# Patient Record
Sex: FEMALE | Race: WHITE | NOT HISPANIC OR LATINO | Employment: UNEMPLOYED | URBAN - METROPOLITAN AREA
[De-identification: names, ages, dates, MRNs, and addresses within clinical notes are randomized per-mention and may not be internally consistent; named-entity substitution may affect disease eponyms.]

---

## 2017-01-01 ENCOUNTER — PATIENT MESSAGE (OUTPATIENT)
Dept: ADMINISTRATIVE | Facility: HOSPITAL | Age: 67
End: 2017-01-01

## 2017-01-01 ENCOUNTER — TELEPHONE (OUTPATIENT)
Dept: UROLOGY | Facility: CLINIC | Age: 67
End: 2017-01-01

## 2017-01-01 ENCOUNTER — PATIENT MESSAGE (OUTPATIENT)
Dept: UROLOGY | Facility: CLINIC | Age: 67
End: 2017-01-01

## 2017-01-01 ENCOUNTER — PROCEDURE VISIT (OUTPATIENT)
Dept: UROLOGY | Facility: CLINIC | Age: 67
End: 2017-01-01
Payer: COMMERCIAL

## 2017-01-01 ENCOUNTER — HOSPITAL ENCOUNTER (OUTPATIENT)
Dept: RADIOLOGY | Facility: HOSPITAL | Age: 67
Discharge: HOME | End: 2017-02-21
Attending: UROLOGY
Payer: COMMERCIAL

## 2017-01-01 ENCOUNTER — TELEPHONE (OUTPATIENT)
Dept: UROLOGY | Facility: HOSPITAL | Age: 67
End: 2017-01-01

## 2017-01-01 ENCOUNTER — TELEPHONE (OUTPATIENT)
Dept: ADMINISTRATIVE | Facility: HOSPITAL | Age: 67
End: 2017-01-01

## 2017-01-01 ENCOUNTER — HOSPITAL ENCOUNTER (OUTPATIENT)
Dept: RADIOLOGY | Facility: HOSPITAL | Age: 67
Discharge: HOME | End: 2017-02-27
Attending: UROLOGY
Payer: COMMERCIAL

## 2017-01-01 ENCOUNTER — OFFICE VISIT (OUTPATIENT)
Dept: UROLOGY | Facility: CLINIC | Age: 67
End: 2017-01-01
Payer: COMMERCIAL

## 2017-01-01 VITALS
WEIGHT: 293 LBS | HEIGHT: 69 IN | DIASTOLIC BLOOD PRESSURE: 78 MMHG | SYSTOLIC BLOOD PRESSURE: 132 MMHG | BODY MASS INDEX: 43.4 KG/M2

## 2017-01-01 VITALS — HEIGHT: 69 IN | BODY MASS INDEX: 43.4 KG/M2 | WEIGHT: 293 LBS

## 2017-01-01 DIAGNOSIS — N39.0 RECURRENT UTI: ICD-10-CM

## 2017-01-01 DIAGNOSIS — N32.81 OVERACTIVE BLADDER: ICD-10-CM

## 2017-01-01 DIAGNOSIS — N28.89 RENAL MASS: ICD-10-CM

## 2017-01-01 DIAGNOSIS — N28.1 RENAL CYST: Primary | ICD-10-CM

## 2017-01-01 DIAGNOSIS — Z12.11 COLON CANCER SCREENING: ICD-10-CM

## 2017-01-01 DIAGNOSIS — N32.81 OVERACTIVE BLADDER: Primary | ICD-10-CM

## 2017-01-01 DIAGNOSIS — N28.89 RENAL MASS: Primary | ICD-10-CM

## 2017-01-01 LAB
BACTERIA UR CULT: NORMAL
BACTERIA UR CULT: NORMAL

## 2017-01-01 PROCEDURE — 76770 US EXAM ABDO BACK WALL COMP: CPT | Mod: 26,,, | Performed by: RADIOLOGY

## 2017-01-01 PROCEDURE — 99999 PR PBB SHADOW E&M-EST. PATIENT-LVL III: CPT | Mod: PBBFAC,,, | Performed by: UROLOGY

## 2017-01-01 PROCEDURE — 87186 SC STD MICRODIL/AGAR DIL: CPT

## 2017-01-01 PROCEDURE — 52000 CYSTOURETHROSCOPY: CPT | Mod: S$GLB,,, | Performed by: UROLOGY

## 2017-01-01 PROCEDURE — 3075F SYST BP GE 130 - 139MM HG: CPT | Mod: S$GLB,,, | Performed by: UROLOGY

## 2017-01-01 PROCEDURE — 87086 URINE CULTURE/COLONY COUNT: CPT

## 2017-01-01 PROCEDURE — 76770 US EXAM ABDO BACK WALL COMP: CPT | Mod: TC

## 2017-01-01 PROCEDURE — 87077 CULTURE AEROBIC IDENTIFY: CPT

## 2017-01-01 PROCEDURE — 99204 OFFICE O/P NEW MOD 45 MIN: CPT | Mod: S$GLB,,, | Performed by: UROLOGY

## 2017-01-01 PROCEDURE — 74170 CT ABD WO CNTRST FLWD CNTRST: CPT | Mod: 26,,, | Performed by: RADIOLOGY

## 2017-01-01 PROCEDURE — 74170 CT ABD WO CNTRST FLWD CNTRST: CPT | Mod: TC

## 2017-01-01 PROCEDURE — 25500020 PHARM REV CODE 255: Performed by: UROLOGY

## 2017-01-01 PROCEDURE — 87088 URINE BACTERIA CULTURE: CPT

## 2017-01-01 PROCEDURE — 3078F DIAST BP <80 MM HG: CPT | Mod: S$GLB,,, | Performed by: UROLOGY

## 2017-01-01 RX ORDER — GLIMEPIRIDE 4 MG/1
4 TABLET ORAL
Status: ON HOLD | COMMUNITY
Start: 2016-11-02 | End: 2018-01-01 | Stop reason: HOSPADM

## 2017-01-01 RX ORDER — APIXABAN 5 MG/1
5 TABLET, FILM COATED ORAL 2 TIMES DAILY
Status: ON HOLD | COMMUNITY
Start: 2017-01-17 | End: 2018-01-01 | Stop reason: HOSPADM

## 2017-01-01 RX ORDER — SITAGLIPTIN 100 MG/1
100 TABLET, FILM COATED ORAL DAILY
Status: ON HOLD | COMMUNITY
Start: 2017-01-28 | End: 2018-01-01 | Stop reason: HOSPADM

## 2017-01-01 RX ORDER — FLUTICASONE PROPIONATE 50 MCG
1 SPRAY, SUSPENSION (ML) NASAL
Status: ON HOLD | COMMUNITY
Start: 2017-01-01 | End: 2018-01-01 | Stop reason: HOSPADM

## 2017-01-01 RX ORDER — NITROFURANTOIN 25; 75 MG/1; MG/1
100 CAPSULE ORAL 2 TIMES DAILY
Qty: 30 CAPSULE | Refills: 11 | Status: ON HOLD | OUTPATIENT
Start: 2017-01-01 | End: 2018-01-01 | Stop reason: HOSPADM

## 2017-01-01 RX ORDER — CIPROFLOXACIN 500 MG/1
500 TABLET ORAL 2 TIMES DAILY
Qty: 14 TABLET | Refills: 0 | Status: ON HOLD | OUTPATIENT
Start: 2017-01-01 | End: 2018-01-01 | Stop reason: HOSPADM

## 2017-01-01 RX ORDER — HYDRALAZINE HYDROCHLORIDE 100 MG/1
100 TABLET, FILM COATED ORAL 3 TIMES DAILY
Refills: 3 | Status: ON HOLD | COMMUNITY
Start: 2017-01-09 | End: 2018-01-01 | Stop reason: HOSPADM

## 2017-01-01 RX ORDER — SULFAMETHOXAZOLE AND TRIMETHOPRIM 800; 160 MG/1; MG/1
1 TABLET ORAL 2 TIMES DAILY
Qty: 14 TABLET | Refills: 0 | Status: SHIPPED | OUTPATIENT
Start: 2017-01-01 | End: 2017-01-01

## 2017-01-01 RX ORDER — CEPHALEXIN 500 MG/1
500 CAPSULE ORAL EVERY 8 HOURS
Qty: 30 CAPSULE | Refills: 0 | Status: SHIPPED | OUTPATIENT
Start: 2017-01-01 | End: 2017-01-01

## 2017-01-01 RX ORDER — SPIRONOLACTONE 100 MG/1
100 TABLET, FILM COATED ORAL DAILY
Refills: 3 | Status: ON HOLD | COMMUNITY
Start: 2017-01-09 | End: 2018-01-01 | Stop reason: HOSPADM

## 2017-01-01 RX ORDER — METOPROLOL SUCCINATE 100 MG/1
100 TABLET, EXTENDED RELEASE ORAL DAILY
Status: ON HOLD | COMMUNITY
Start: 2016-12-07 | End: 2018-01-01 | Stop reason: HOSPADM

## 2017-01-01 RX ADMIN — IOHEXOL 100 ML: 350 INJECTION, SOLUTION INTRAVENOUS at 03:02

## 2017-01-01 RX ADMIN — IOHEXOL 15 ML: 300 INJECTION, SOLUTION INTRAVENOUS at 03:02

## 2017-02-06 PROBLEM — N39.0 RECURRENT UTI: Status: ACTIVE | Noted: 2017-01-01

## 2017-02-06 NOTE — PROGRESS NOTES
"  Subjective:       Rena Quintana is a 66 y.o. female who is a new patient who was self-referred for evaluation of UI     Urinary Incontinence  Patient complains of urinary incontinence. This has been present for several years. She leaks urine with with urge. Patient describes the symptoms as frequent urination (many x per day), nocturia 3 times per night, sensation of incomplete emptying of bladder and urge to urinate with little or no warning. Factors associated with symptoms include none known. Evaluation to date includes none. Treatment to date includes oxybutinin, which was somewhat effective. She is s/p hysterectomy (age 40). Also reports "bladder tack" done for AMI (?sling) when she was young but then had another child after that.     She has seen a urologist since teenager (Dr Tovar) for recurrent UTIs. UA is suspicious for UTI today. She has been on Macrobid 100mg daily - but off this for a while. Mild dysuria, worsened with intercourse.     She takes Lasix, spironolactone, Eliquis, DM2, and OAC among her multiple medications. BMI 44. She has pulm HTN and CHF.       The following portions of the patient's history were reviewed and updated as appropriate: allergies, current medications, past family history, past medical history, past social history, past surgical history and problem list.    Review of Systems  Constitutional: no fever or chills  ENT: no nasal congestion or sore throat  Respiratory: no cough or shortness of breath  Cardiovascular: no chest pain or palpitations  Gastrointestinal: no nausea or vomiting, tolerating diet  Genitourinary: as per HPI  Hematologic/Lymphatic: no easy bruising or lymphadenopathy  Musculoskeletal: no arthralgias or myalgias  Skin: no rashes or lesions  Neurological: no seizures or tremors  Behavioral/Psych: no auditory or visual hallucinations       Objective:    Vitals:   Visit Vitals    /78    Ht 5' 9" (1.753 m)    Wt 136 kg (299 lb 13.2 oz)    BMI 44.28 " kg/m2       Physical Exam   General: well developed, well nourished in no acute distress  Head: normocephalic, atraumatic  Neck: supple, trachea midline, no obvious enlargement of thyroid  HEENT: EOMI, mucus membranes moist, sclera anicteric, no hearing impairment  Lungs: symmetric expansion, non-labored breathing  Cardiovascular: regular rate and rhythm, normal pulses  Abdomen: soft, non tender, non distended, no palpable masses, no hepatosplenomegaly, no hernias, no CVA tenderness  Musculoskeletal: no peripheral edema, normal ROM in bilateral upper and lower extremities  Lymphatics: no cervical or inguinal lymphadenopathy  Skin: no rashes or lesions  Neuro: alert and oriented x 3, no gross deficits  Psych: normal judgment and insight, normal mood/affect and non-anxious  Genitourinary:   patient declined exam      Lab Review   Urine analysis today in clinic shows positive for nitrites, leukocytes, red blood cells, glucose, protein     Lab Results   Component Value Date    WBC 7.61 09/13/2013    HGB 10.6 (L) 09/13/2013    HCT 33.0 (L) 09/13/2013    MCV 80 (L) 09/13/2013     09/13/2013     Lab Results   Component Value Date    CREATININE 0.8 09/13/2013    BUN 14 09/13/2013         Imaging  NA         Assessment/Plan:      1. Overactive bladder / UUI   - Discussed difference of UUI and AMI components. Reviewed etiology and workup of each.   - UUI: Behavioral changes, PFPT, anticholinergics. Botox/InterStim for refractory UUI. Will try to avoid Botox for now due to comorbidities but she may be good candidate for this.    - Continue Ditropan XL 15   - Add Myrbetriq 50mg     2. Recurrent UTI    - Discussed UTI prevention strategies.   - Adequate hydration.   - Double voiding. Consider timed voiding.    - Avoid constipation.   - RANDEE with PVR to monitor upper tracts/AARON.   - Cystoscopy   - Cranberry/probiotics.   - Consider Estrace cream 2x weekly - will consider at next appt   - Macrobid 100mg daily (prior  regimen)   - Call with UTI symptoms so UA/UCx can be sent.    - UCx today (UA suspicous)         Follow up in 6 weeks for cystoscopy

## 2017-02-06 NOTE — MR AVS SNAPSHOT
[facility]  [address]  [address]  [place] [address]  Phone: [phone]                  Rena Quintana   2017 1:00 PM   Office Visit    Description:  Female : 1950   Provider:  Lindsey Yost MD   Department:  [facility]           Reason for Visit     Establish Care           Diagnoses this Visit        Comments    Overactive bladder    -  Primary     Recurrent UTI                To Do List           Goals (5 Years of Data)     None      Follow-Up and Disposition     Return in about 6 weeks (around 3/20/2017) for Renal US follow up, Cystoscopy.       These Medications        Disp Refills Start End    mirabegron 50 mg Tb24 30 tablet 11 2017    Take 1 tablet (50 mg total) by mouth once daily. - Oral    Pharmacy: [facility] [address] Ph #: [phone]       nitrofurantoin, macrocrystal-monohydrate, (MACROBID) 100 MG capsule 30 capsule 11 2017    Take 1 capsule (100 mg total) by mouth 2 (two) times daily. - Oral    Pharmacy: [address] Ph #: [phone]         [facility] On Call     [facility] On Call Nurse Care Line -  Assistance  Registered nurses in the [facility] provide clinical advisement, health education, appointment booking, and other advisory services.  Call for this free service at [phone].             Medications           Message regarding Medications     Verify the changes and/or additions to your medication regime listed below are the same as discussed with your clinician today.  If any of these changes or additions are incorrect, please notify your healthcare provider.        START taking these NEW medications        Refills    mirabegron 50 mg Tb24 11    Sig: Take 1 tablet (50 mg total) by mouth once daily.    Class: Normal    Route: Oral    nitrofurantoin, macrocrystal-monohydrate, (MACROBID) 100 MG  capsule 11    Sig: Take 1 capsule (100 mg total) by mouth 2 (two) times daily.    Class: Normal    Route: Oral      STOP taking these medications     dextrose 5 % SolP 100 mL with milrinone 1 mg/mL Soln 40 mg Inject into the vein continuous.    doxycycline (MONODOX) 100 MG capsule Take 1 capsule (100 mg total) by mouth 2 (two) times daily.    KLOR-CON M20 20 mEq tablet     labetalol (NORMODYNE) 200 MG tablet Take 200 mg by mouth 2 (two) times daily. Take 2 tabs by mouth 2 times a day    metolazone (ZAROXOLYN) 5 MG tablet     tadalafil (ADCIRCA) 20 MG Tab Take one tablet daily for seven days. If tolerated, increase to two tablets daily thereafter.           Verify that the below list of medications is an accurate representation of the medications you are currently taking.  If none reported, the list may be blank. If incorrect, please contact your healthcare provider. Carry this list with you in case of emergency.           Current Medications     amiodarone (PACERONE) 200 MG Tab     amlodipine (NORVASC) 10 MG tablet Take 10 mg by mouth once daily.    atorvastatin (LIPITOR) 40 MG tablet Take 40 mg by mouth once daily.    ELIQUIS 5 mg Tab     glimepiride (AMARYL) 4 MG tablet     hydrALAZINE (APRESOLINE) 100 MG tablet Take 100 mg by mouth 3 (three) times daily.    JANUVIA 100 mg Tab     levothyroxine (SYNTHROID) 50 MCG tablet Take 50 mcg by mouth once daily.    metformin (GLUCOPHAGE) 1000 MG tablet Take 1,000 mg by mouth 2 (two) times daily with meals.    metoprolol succinate (TOPROL-XL) 100 MG 24 hr tablet     multivitamin (ONE DAILY MULTIVITAMIN) per tablet Take 1 tablet by mouth once daily.    oxybutynin (DITROPAN XL) 15 MG TR24 Take 5 mg by mouth once daily.    sertraline (ZOLOFT) 50 MG tablet     sildenafil (REVATIO) 20 mg Tab Take 20 mg by mouth 3 (three) times daily. Discontinued 9/13/13--to start Adcirca    spironolactone (ALDACTONE) 100 MG tablet Take 100 mg by mouth once daily.    valsartan (DIOVAN) 320 MG  "tablet Take 320 mg by mouth once daily.    vitamin D 1000 units Tab Take 185 mg by mouth once daily.    furosemide (LASIX) 40 MG tablet Take 1 tablet (40 mg total) by mouth once daily.    mirabegron 50 mg Tb24 Take 1 tablet (50 mg total) by mouth once daily.    nitrofurantoin, macrocrystal-monohydrate, (MACROBID) 100 MG capsule Take 1 capsule (100 mg total) by mouth 2 (two) times daily.           Clinical Reference Information           Your Vitals Were     BP Height Weight BMI       132/78 5' 9" (1.753 m) 136 kg (299 lb 13.2 oz) 44.28 kg/m2       Blood Pressure          Most Recent Value    BP  132/78      Allergies as of 2/6/2017     Cardizem [Diltiazem Hcl]    Procardia [Nifedipine]      Immunizations Administered on Date of Encounter - 2/6/2017     None      Orders Placed During Today's Visit      Normal Orders This Visit    Urine culture     Future Labs/Procedures Expected by Expires    US Retroperitoneal Complete (Kidney and  2/6/2017 2/7/2018    Cystoscopy  As directed 2/6/2018      [facility] Sign-Up     Activating your [facility] account is as easy as 1-2-3!     1) Visit [email or web address], select Sign Up Now, enter this activation code and your date of birth, then select Next.  [email or web address]  Expires: 3/23/2017  1:36 PM      2) Create a username and password to use when you visit [facility] in the future and select a security question in case you lose your password and select Next.    3) Enter your e-mail address and click Sign Up!    Additional Information  If you have questions, please e-mail [email or web address] or call [phone] to talk to our [facility] staff. Remember, [facility] is NOT to be used for urgent needs. For medical emergencies, dial 911.         Language Assistance Services     ATTENTION: Language assistance services are available, free of charge. Please call [phone].      ATENCIÓN: Si habla español, tiene a ocampo disposición servicios gratuitos de asistencia lingüística. Llame al " [phone].     JERRY Ý: N?u b?n nói Ti?ng Vi?t, có các d?ch v? h? tr? ngôn ng? mi?n phí dành cho b?n. G?i s? [phone].         [facility] complies with applicable Federal civil rights laws and does not discriminate on the basis of race, color, national origin, age, disability, or sex.

## 2017-02-08 NOTE — TELEPHONE ENCOUNTER
Please inform patient of urinary tract infection on recent urine culture. Appropriate antibiotics (Bactrim) were sent in to the pharmacy.

## 2017-02-22 NOTE — TELEPHONE ENCOUNTER
Discussed abnormal RANDEE result with patient.     CT renal protocol and Cr ordered.    Please help arrange to be done in next 1-2 weeks. Thanks.

## 2017-02-22 NOTE — TELEPHONE ENCOUNTER
CT set for Monday Feb 27.2017. Patient given instructions and report 1 hour before apt time to be prepped.

## 2017-02-27 NOTE — TELEPHONE ENCOUNTER
Please inform pt:    CT scan showed that the cyst in the kidney does not need intervention or surgery at this time.     Recommend RANDEE in 6 months to make sure it stays stable.     Keep follow up appt with me to discuss.

## 2017-03-21 NOTE — MR AVS SNAPSHOT
[facility]  [address]  [address]  [place] [address]  Phone: [phone]                  Rena Quintana   3/21/2017 9:40 AM   Procedure visit    Description:  Female : 1950   Provider:  Lindsey Yost MD   Department:  [facility]           Reason for Visit     Over active bladder           Diagnoses this Visit        Comments    Overactive bladder         Recurrent UTI                To Do List           Future Appointments        Provider Department Dept Phone    2017 10:00 AM Lindsey Yost MD [facility] [phone]      Goals (5 Years of Data)     None      Follow-Up and Disposition     Return in about 3 months (around 2017) for Follow up on symptoms.       These Medications        Disp Refills Start End    mirabegron 50 mg Tb24 90 tablet 3 3/21/2017 3/21/2018    Take 1 tablet (50 mg total) by mouth once daily. - Oral    Pharmacy: [address] Ph #: [phone]       cephALEXin (KEFLEX) 500 MG capsule 30 capsule 0 3/21/2017 3/31/2017    Take 1 capsule (500 mg total) by mouth every 8 (eight) hours. - Oral    Pharmacy: [address] Ph #: [phone]         [facility] On Call     [facility] On Call Nurse Care Line -  Assistance  Registered nurses in the [facility] provide clinical advisement, health education, appointment booking, and other advisory services.  Call for this free service at [phone].             Medications           Message regarding Medications     Verify the changes and/or additions to your medication regime listed below are the same as discussed with your clinician today.  If any of these changes or additions are incorrect, please notify your healthcare provider.        START taking these NEW medications        Refills    cephALEXin (KEFLEX) 500 MG capsule 0    Sig: Take 1 capsule (500 mg total)  by mouth every 8 (eight) hours.    Class: Normal    Route: Oral           Verify that the below list of medications is an accurate representation of the medications you are currently taking.  If none reported, the list may be blank. If incorrect, please contact your healthcare provider. Carry this list with you in case of emergency.           Current Medications     amiodarone (PACERONE) 200 MG Tab     amlodipine (NORVASC) 10 MG tablet Take 10 mg by mouth once daily.    atorvastatin (LIPITOR) 40 MG tablet Take 40 mg by mouth once daily.    ELIQUIS 5 mg Tab 5 mg 2 (two) times daily.     fluticasone (FLONASE) 50 mcg/actuation nasal spray 1 spray by Nasal route as needed.     furosemide (LASIX) 40 MG tablet Take 1 tablet (40 mg total) by mouth once daily.    glimepiride (AMARYL) 4 MG tablet Take 4 mg by mouth daily with breakfast.     hydrALAZINE (APRESOLINE) 100 MG tablet Take 100 mg by mouth 3 (three) times daily.    JANUVIA 100 mg Tab Take 100 mg by mouth once daily.     levothyroxine (SYNTHROID) 50 MCG tablet Take 50 mcg by mouth once daily.    metformin (GLUCOPHAGE) 1000 MG tablet Take 1,000 mg by mouth 2 (two) times daily with meals.    metoprolol succinate (TOPROL-XL) 100 MG 24 hr tablet Take 100 mg by mouth once daily.     mirabegron 50 mg Tb24 Take 1 tablet (50 mg total) by mouth once daily.    nitrofurantoin, macrocrystal-monohydrate, (MACROBID) 100 MG capsule Take 1 capsule (100 mg total) by mouth 2 (two) times daily.    oxybutynin (DITROPAN XL) 15 MG TR24 Take 5 mg by mouth once daily.    sertraline (ZOLOFT) 50 MG tablet Take 50 mg by mouth once daily.     sildenafil (REVATIO) 20 mg Tab Take 20 mg by mouth 3 (three) times daily. Discontinued 9/13/13--to start Adcirca    spironolactone (ALDACTONE) 100 MG tablet Take 100 mg by mouth once daily.    valsartan (DIOVAN) 320 MG tablet Take 320 mg by mouth once daily.    vitamin D 1000 units Tab Take 185 mg by mouth once daily.    cephALEXin (KEFLEX) 500 MG capsule  "Take 1 capsule (500 mg total) by mouth every 8 (eight) hours.    multivitamin (ONE DAILY MULTIVITAMIN) per tablet Take 1 tablet by mouth once daily.           Clinical Reference Information           Your Vitals Were     Height Weight BMI          5' 9" (1.753 m) 135.2 kg (298 lb 1 oz) 44.02 kg/m2        Allergies as of 3/21/2017     Cardizem [Diltiazem Hcl]    Procardia [Nifedipine]      Immunizations Administered on Date of Encounter - 3/21/2017     None      Orders Placed During Today's Visit      Normal Orders This Visit    Cystoscopy     Urine culture       Language Assistance Services     ATTENTION: Language assistance services are available, free of charge. Please call [phone].      ATENCIÓN: Si [place], tiene a ocampo disposición servicios gratuitos de asistencia lingüística. Llame al [phone].     CHÚ Ý: N?u b?n nói Ti?ng Vi?t, có các d?ch v? h? tr? ngôn ng? mi?n phí dành cho b?n. G?i s? [phone].         [facility] complies with applicable Federal civil rights laws and does not discriminate on the basis of race, color, national origin, age, disability, or sex.        "

## 2017-03-21 NOTE — PROCEDURES
"Cystoscopy  Date/Time: 3/21/2017 10:09 AM  Performed by: JAIMEE JOHNSTON  Authorized by: JAIMEE JOHNSTON     Consent Done?:  Yes (Written)  Time out: Immediately prior to procedure a "time out" was called to verify the correct patient, procedure, equipment, support staff and site/side marked as required.    Indications: recurrent UTIs    Position:  Dorsal lithotomy  Anesthesia:  Lidocaine jelly  Patient sedated?: No    Preparation: Patient was prepped and draped in usual sterile fashion      Scope type:  Flexible cystoscope  Stent inserted: No    Stent removed: No    External exam normal: Yes    Digital exam performed: No    Urethra normal: Yes  Bladder neck normal: Bladder neck normal   Bladder normal: No (Widespread cystitis cystica c/w UTI. No tumors, stones.)      Patient tolerance:  Patient tolerated the procedure well with no immediate complications     +cystitis cystica on cysto. Suspect active UTI.  UCx today. Treat empirically with Keflex.  Bosniak 2F cyst on CT - recheck RANDEE in 6mths (8/17).  Consider change of DM2 medications to be off Januvia. Will tall to PCP.      "

## 2017-03-27 NOTE — TELEPHONE ENCOUNTER
Please inform patient of urinary tract infection on recent urine culture. Appropriate antibiotics (Cipro) were sent in to the pharmacy.      Stop Keflex given and start Cipro.

## 2017-03-28 NOTE — TELEPHONE ENCOUNTER
Called and spoke to patient's . Patient is currently inpatient at [facility]. I advised him that patient UCx came back showing she had a UTI and what abt we gave. I advised him to let the provider caring for her know that they may treat accordingly while she is inpatient.

## 2017-04-07 NOTE — TELEPHONE ENCOUNTER
Spoke with Ms. Mariano she stated that she was just released from [facility] for the flu. SHe will follow up with DR. Yanes in about 2 weeks and will sign a release of information on the visit.

## 2018-01-01 ENCOUNTER — TELEPHONE (OUTPATIENT)
Dept: ADMINISTRATIVE | Facility: HOSPITAL | Age: 68
End: 2018-01-01

## 2018-01-01 ENCOUNTER — HOSPITAL ENCOUNTER (INPATIENT)
Facility: HOSPITAL | Age: 68
LOS: 1 days | DRG: 393 | End: 2018-02-04
Attending: INTERNAL MEDICINE | Admitting: INTERNAL MEDICINE
Payer: MEDICARE

## 2018-01-01 VITALS
SYSTOLIC BLOOD PRESSURE: 98 MMHG | HEART RATE: 94 BPM | RESPIRATION RATE: 6 BRPM | TEMPERATURE: 99 F | HEIGHT: 62 IN | OXYGEN SATURATION: 93 % | WEIGHT: 293 LBS | BODY MASS INDEX: 53.92 KG/M2 | DIASTOLIC BLOOD PRESSURE: 57 MMHG

## 2018-01-01 DIAGNOSIS — R00.0 TACHYCARDIA: ICD-10-CM

## 2018-01-01 DIAGNOSIS — I48.91 ATRIAL FIBRILLATION: ICD-10-CM

## 2018-01-01 DIAGNOSIS — K22.89: ICD-10-CM

## 2018-01-01 LAB
ALBUMIN SERPL BCP-MCNC: 2.8 G/DL
ALBUMIN SERPL BCP-MCNC: 3.1 G/DL
ALLENS TEST: ABNORMAL
ALP SERPL-CCNC: 87 U/L
ALP SERPL-CCNC: 99 U/L
ALT SERPL W/O P-5'-P-CCNC: 560 U/L
ALT SERPL W/O P-5'-P-CCNC: 958 U/L
ANION GAP SERPL CALC-SCNC: 12 MMOL/L
ANION GAP SERPL CALC-SCNC: 15 MMOL/L
AST SERPL-CCNC: 815 U/L
AST SERPL-CCNC: 964 U/L
BACTERIA #/AREA URNS AUTO: ABNORMAL /HPF
BASOPHILS # BLD AUTO: 0.06 K/UL
BASOPHILS # BLD AUTO: 0.1 K/UL
BASOPHILS NFR BLD: 0.3 %
BASOPHILS NFR BLD: 0.5 %
BILIRUB SERPL-MCNC: 1.4 MG/DL
BILIRUB SERPL-MCNC: 1.9 MG/DL
BILIRUB UR QL STRIP: NEGATIVE
BUN SERPL-MCNC: 30 MG/DL
BUN SERPL-MCNC: 50 MG/DL
CALCIUM SERPL-MCNC: 8.4 MG/DL
CALCIUM SERPL-MCNC: 8.8 MG/DL
CHLORIDE SERPL-SCNC: 100 MMOL/L
CHLORIDE SERPL-SCNC: 97 MMOL/L
CLARITY UR REFRACT.AUTO: ABNORMAL
CO2 SERPL-SCNC: 20 MMOL/L
CO2 SERPL-SCNC: 23 MMOL/L
COLOR UR AUTO: ABNORMAL
CREAT SERPL-MCNC: 2.4 MG/DL
CREAT SERPL-MCNC: 3.8 MG/DL
DELSYS: ABNORMAL
DIFFERENTIAL METHOD: ABNORMAL
DIFFERENTIAL METHOD: ABNORMAL
EOSINOPHIL # BLD AUTO: 0 K/UL
EOSINOPHIL # BLD AUTO: 0.1 K/UL
EOSINOPHIL NFR BLD: 0.2 %
EOSINOPHIL NFR BLD: 0.3 %
ERYTHROCYTE [DISTWIDTH] IN BLOOD BY AUTOMATED COUNT: 15.2 %
ERYTHROCYTE [DISTWIDTH] IN BLOOD BY AUTOMATED COUNT: 16.1 %
ERYTHROCYTE [SEDIMENTATION RATE] IN BLOOD BY WESTERGREN METHOD: 12 MM/H
EST. GFR  (AFRICAN AMERICAN): 13.6 ML/MIN/1.73 M^2
EST. GFR  (AFRICAN AMERICAN): 23.7 ML/MIN/1.73 M^2
EST. GFR  (NON AFRICAN AMERICAN): 11.8 ML/MIN/1.73 M^2
EST. GFR  (NON AFRICAN AMERICAN): 20.6 ML/MIN/1.73 M^2
ESTIMATED AVG GLUCOSE: 174 MG/DL
ESTIMATED PA SYSTOLIC PRESSURE: 42.44
FIO2: 100
GLUCOSE SERPL-MCNC: 213 MG/DL
GLUCOSE SERPL-MCNC: 226 MG/DL
GLUCOSE UR QL STRIP: ABNORMAL
GRAM STN SPEC: NORMAL
GRAM STN SPEC: NORMAL
HBA1C MFR BLD HPLC: 7.7 %
HCO3 UR-SCNC: 26.7 MMOL/L (ref 24–28)
HCT VFR BLD AUTO: 40.9 %
HCT VFR BLD AUTO: 41.5 %
HGB BLD-MCNC: 13.2 G/DL
HGB BLD-MCNC: 13.5 G/DL
HGB UR QL STRIP: ABNORMAL
HYALINE CASTS UR QL AUTO: 0 /LPF
IMM GRANULOCYTES # BLD AUTO: 0.17 K/UL
IMM GRANULOCYTES # BLD AUTO: 0.27 K/UL
IMM GRANULOCYTES NFR BLD AUTO: 0.9 %
IMM GRANULOCYTES NFR BLD AUTO: 1.5 %
INR PPP: 1.4
INR PPP: 1.5
KETONES UR QL STRIP: NEGATIVE
LACTATE SERPL-SCNC: 1.9 MMOL/L
LEUKOCYTE ESTERASE UR QL STRIP: NEGATIVE
LYMPHOCYTES # BLD AUTO: 2.4 K/UL
LYMPHOCYTES # BLD AUTO: 3.3 K/UL
LYMPHOCYTES NFR BLD: 13.2 %
LYMPHOCYTES NFR BLD: 16.7 %
MAGNESIUM SERPL-MCNC: 1.5 MG/DL
MAGNESIUM SERPL-MCNC: 2.2 MG/DL
MCH RBC QN AUTO: 27.8 PG
MCH RBC QN AUTO: 28.1 PG
MCHC RBC AUTO-ENTMCNC: 32.3 G/DL
MCHC RBC AUTO-ENTMCNC: 32.5 G/DL
MCV RBC AUTO: 86 FL
MCV RBC AUTO: 86 FL
MICROSCOPIC COMMENT: ABNORMAL
MITRAL VALVE MOBILITY: NORMAL
MODE: ABNORMAL
MONOCYTES # BLD AUTO: 1.1 K/UL
MONOCYTES # BLD AUTO: 1.3 K/UL
MONOCYTES NFR BLD: 5.4 %
MONOCYTES NFR BLD: 7 %
NEUTROPHILS # BLD AUTO: 14.3 K/UL
NEUTROPHILS # BLD AUTO: 15.3 K/UL
NEUTROPHILS NFR BLD: 76.2 %
NEUTROPHILS NFR BLD: 77.8 %
NITRITE UR QL STRIP: NEGATIVE
NRBC BLD-RTO: 0 /100 WBC
NRBC BLD-RTO: 1 /100 WBC
PCO2 BLDA: 38.6 MMHG (ref 35–45)
PEEP: 5
PH SMN: 7.45 [PH] (ref 7.35–7.45)
PH UR STRIP: 5 [PH] (ref 5–8)
PHOSPHATE SERPL-MCNC: 2.3 MG/DL
PHOSPHATE SERPL-MCNC: 7 MG/DL
PIP: 26
PLATELET # BLD AUTO: 101 K/UL
PLATELET # BLD AUTO: 181 K/UL
PMV BLD AUTO: 10.8 FL
PMV BLD AUTO: 11.2 FL
PO2 BLDA: 87 MMHG (ref 80–100)
POC BE: 3 MMOL/L
POC SATURATED O2: 97 % (ref 95–100)
POC TCO2: 28 MMOL/L (ref 23–27)
POCT GLUCOSE: 120 MG/DL (ref 70–110)
POCT GLUCOSE: 141 MG/DL (ref 70–110)
POCT GLUCOSE: 216 MG/DL (ref 70–110)
POCT GLUCOSE: 234 MG/DL (ref 70–110)
POTASSIUM SERPL-SCNC: 4.4 MMOL/L
POTASSIUM SERPL-SCNC: 4.8 MMOL/L
PROT SERPL-MCNC: 6.4 G/DL
PROT SERPL-MCNC: 6.9 G/DL
PROT UR QL STRIP: ABNORMAL
PROTHROMBIN TIME: 13.7 SEC
PROTHROMBIN TIME: 14.6 SEC
RBC # BLD AUTO: 4.75 M/UL
RBC # BLD AUTO: 4.81 M/UL
RBC #/AREA URNS AUTO: 2 /HPF (ref 0–4)
RETIRED EF AND QEF - SEE NOTES: 40 (ref 55–65)
SAMPLE: ABNORMAL
SITE: ABNORMAL
SODIUM SERPL-SCNC: 132 MMOL/L
SODIUM SERPL-SCNC: 135 MMOL/L
SP GR UR STRIP: >=1.03 (ref 1–1.03)
SP02: 96
SQUAMOUS #/AREA URNS AUTO: 1 /HPF
TRICUSPID VALVE REGURGITATION: ABNORMAL
URN SPEC COLLECT METH UR: ABNORMAL
UROBILINOGEN UR STRIP-ACNC: NEGATIVE EU/DL
VANCOMYCIN SERPL-MCNC: 23.1 UG/ML
VANCOMYCIN SERPL-MCNC: 38.9 UG/ML
VT: 450
WBC # BLD AUTO: 18.41 K/UL
WBC # BLD AUTO: 20 K/UL
WBC #/AREA URNS AUTO: 4 /HPF (ref 0–5)

## 2018-01-01 PROCEDURE — 83735 ASSAY OF MAGNESIUM: CPT

## 2018-01-01 PROCEDURE — 97802 MEDICAL NUTRITION INDIV IN: CPT

## 2018-01-01 PROCEDURE — 94003 VENT MGMT INPAT SUBQ DAY: CPT

## 2018-01-01 PROCEDURE — 80053 COMPREHEN METABOLIC PANEL: CPT

## 2018-01-01 PROCEDURE — 25000003 PHARM REV CODE 250: Performed by: STUDENT IN AN ORGANIZED HEALTH CARE EDUCATION/TRAINING PROGRAM

## 2018-01-01 PROCEDURE — 94761 N-INVAS EAR/PLS OXIMETRY MLT: CPT

## 2018-01-01 PROCEDURE — 25000003 PHARM REV CODE 250: Performed by: INTERNAL MEDICINE

## 2018-01-01 PROCEDURE — 99223 1ST HOSP IP/OBS HIGH 75: CPT | Mod: ,,, | Performed by: PSYCHIATRY & NEUROLOGY

## 2018-01-01 PROCEDURE — 99291 CRITICAL CARE FIRST HOUR: CPT | Mod: ,,, | Performed by: INTERNAL MEDICINE

## 2018-01-01 PROCEDURE — 25000003 PHARM REV CODE 250

## 2018-01-01 PROCEDURE — 84100 ASSAY OF PHOSPHORUS: CPT

## 2018-01-01 PROCEDURE — 99223 1ST HOSP IP/OBS HIGH 75: CPT | Mod: ,,, | Performed by: THORACIC SURGERY (CARDIOTHORACIC VASCULAR SURGERY)

## 2018-01-01 PROCEDURE — 63600175 PHARM REV CODE 636 W HCPCS: Performed by: STUDENT IN AN ORGANIZED HEALTH CARE EDUCATION/TRAINING PROGRAM

## 2018-01-01 PROCEDURE — 85610 PROTHROMBIN TIME: CPT

## 2018-01-01 PROCEDURE — 25000003 PHARM REV CODE 250: Performed by: HOSPITALIST

## 2018-01-01 PROCEDURE — 80202 ASSAY OF VANCOMYCIN: CPT | Mod: 91

## 2018-01-01 PROCEDURE — S0028 INJECTION, FAMOTIDINE, 20 MG: HCPCS | Performed by: INTERNAL MEDICINE

## 2018-01-01 PROCEDURE — 37799 UNLISTED PX VASCULAR SURGERY: CPT

## 2018-01-01 PROCEDURE — 63600175 PHARM REV CODE 636 W HCPCS: Performed by: INTERNAL MEDICINE

## 2018-01-01 PROCEDURE — 83605 ASSAY OF LACTIC ACID: CPT

## 2018-01-01 PROCEDURE — 83036 HEMOGLOBIN GLYCOSYLATED A1C: CPT

## 2018-01-01 PROCEDURE — 99233 SBSQ HOSP IP/OBS HIGH 50: CPT | Mod: ,,, | Performed by: PSYCHIATRY & NEUROLOGY

## 2018-01-01 PROCEDURE — 99900026 HC AIRWAY MAINTENANCE (STAT)

## 2018-01-01 PROCEDURE — 93306 TTE W/DOPPLER COMPLETE: CPT | Mod: 26,,, | Performed by: INTERNAL MEDICINE

## 2018-01-01 PROCEDURE — 99292 CRITICAL CARE ADDL 30 MIN: CPT | Mod: ,,, | Performed by: INTERNAL MEDICINE

## 2018-01-01 PROCEDURE — 82803 BLOOD GASES ANY COMBINATION: CPT

## 2018-01-01 PROCEDURE — 94002 VENT MGMT INPAT INIT DAY: CPT

## 2018-01-01 PROCEDURE — 93306 TTE W/DOPPLER COMPLETE: CPT

## 2018-01-01 PROCEDURE — 81001 URINALYSIS AUTO W/SCOPE: CPT

## 2018-01-01 PROCEDURE — 25500020 PHARM REV CODE 255: Performed by: INTERNAL MEDICINE

## 2018-01-01 PROCEDURE — 99232 SBSQ HOSP IP/OBS MODERATE 35: CPT | Mod: ,,, | Performed by: INTERNAL MEDICINE

## 2018-01-01 PROCEDURE — 99900035 HC TECH TIME PER 15 MIN (STAT)

## 2018-01-01 PROCEDURE — 5A1945Z RESPIRATORY VENTILATION, 24-96 CONSECUTIVE HOURS: ICD-10-PCS | Performed by: INTERNAL MEDICINE

## 2018-01-01 PROCEDURE — 87040 BLOOD CULTURE FOR BACTERIA: CPT | Mod: 59

## 2018-01-01 PROCEDURE — 20000000 HC ICU ROOM

## 2018-01-01 PROCEDURE — 85025 COMPLETE CBC W/AUTO DIFF WBC: CPT

## 2018-01-01 PROCEDURE — 87205 SMEAR GRAM STAIN: CPT

## 2018-01-01 PROCEDURE — 63600175 PHARM REV CODE 636 W HCPCS

## 2018-01-01 PROCEDURE — 36415 COLL VENOUS BLD VENIPUNCTURE: CPT

## 2018-01-01 PROCEDURE — 93010 ELECTROCARDIOGRAM REPORT: CPT | Mod: ,,, | Performed by: INTERNAL MEDICINE

## 2018-01-01 PROCEDURE — 51702 INSERT TEMP BLADDER CATH: CPT

## 2018-01-01 PROCEDURE — 27000221 HC OXYGEN, UP TO 24 HOURS

## 2018-01-01 PROCEDURE — 80202 ASSAY OF VANCOMYCIN: CPT

## 2018-01-01 RX ORDER — MIDAZOLAM HYDROCHLORIDE 1 MG/ML
2 INJECTION INTRAMUSCULAR; INTRAVENOUS
Status: DISCONTINUED | OUTPATIENT
Start: 2018-01-01 | End: 2018-01-01 | Stop reason: HOSPADM

## 2018-01-01 RX ORDER — LEVETIRACETAM 5 MG/ML
500 INJECTION INTRAVASCULAR EVERY 12 HOURS
Status: DISCONTINUED | OUTPATIENT
Start: 2018-01-01 | End: 2018-01-01 | Stop reason: HOSPADM

## 2018-01-01 RX ORDER — ACETAMINOPHEN 10 MG/ML
1000 INJECTION, SOLUTION INTRAVENOUS ONCE
Status: COMPLETED | OUTPATIENT
Start: 2018-01-01 | End: 2018-01-01

## 2018-01-01 RX ORDER — LEVOTHYROXINE SODIUM ANHYDROUS 100 UG/5ML
25 INJECTION, POWDER, LYOPHILIZED, FOR SOLUTION INTRAVENOUS DAILY
Status: DISCONTINUED | OUTPATIENT
Start: 2018-01-01 | End: 2018-01-01

## 2018-01-01 RX ORDER — INSULIN ASPART 100 [IU]/ML
0-5 INJECTION, SOLUTION INTRAVENOUS; SUBCUTANEOUS EVERY 6 HOURS PRN
Status: DISCONTINUED | OUTPATIENT
Start: 2018-01-01 | End: 2018-01-01

## 2018-01-01 RX ORDER — MAGNESIUM SULFATE HEPTAHYDRATE 40 MG/ML
2 INJECTION, SOLUTION INTRAVENOUS ONCE
Status: COMPLETED | OUTPATIENT
Start: 2018-01-01 | End: 2018-01-01

## 2018-01-01 RX ORDER — ACETAMINOPHEN 650 MG/1
650 SUPPOSITORY RECTAL EVERY 6 HOURS PRN
Status: DISCONTINUED | OUTPATIENT
Start: 2018-01-01 | End: 2018-01-01

## 2018-01-01 RX ORDER — CHLORHEXIDINE GLUCONATE ORAL RINSE 1.2 MG/ML
15 SOLUTION DENTAL 2 TIMES DAILY
Status: DISCONTINUED | OUTPATIENT
Start: 2018-01-01 | End: 2018-01-01

## 2018-01-01 RX ORDER — GLUCAGON 1 MG
1 KIT INJECTION
Status: DISCONTINUED | OUTPATIENT
Start: 2018-01-01 | End: 2018-01-01

## 2018-01-01 RX ORDER — FUROSEMIDE 10 MG/ML
80 INJECTION INTRAMUSCULAR; INTRAVENOUS ONCE
Status: COMPLETED | OUTPATIENT
Start: 2018-01-01 | End: 2018-01-01

## 2018-01-01 RX ORDER — GLYCOPYRROLATE 0.2 MG/ML
0.2 INJECTION INTRAMUSCULAR; INTRAVENOUS ONCE
Status: DISCONTINUED | OUTPATIENT
Start: 2018-01-01 | End: 2018-01-01 | Stop reason: HOSPADM

## 2018-01-01 RX ORDER — SCOLOPAMINE TRANSDERMAL SYSTEM 1 MG/1
1 PATCH, EXTENDED RELEASE TRANSDERMAL
Status: DISCONTINUED | OUTPATIENT
Start: 2018-01-01 | End: 2018-01-01

## 2018-01-01 RX ORDER — SODIUM CHLORIDE 0.9 % (FLUSH) 0.9 %
3 SYRINGE (ML) INJECTION
Status: DISCONTINUED | OUTPATIENT
Start: 2018-01-01 | End: 2018-01-01

## 2018-01-01 RX ORDER — FENTANYL CITRATE-0.9 % NACL/PF 10 MCG/ML
25 PLASTIC BAG, INJECTION (ML) INTRAVENOUS CONTINUOUS
Status: DISCONTINUED | OUTPATIENT
Start: 2018-01-01 | End: 2018-01-01 | Stop reason: HOSPADM

## 2018-01-01 RX ORDER — FAMOTIDINE 10 MG/ML
20 INJECTION INTRAVENOUS 2 TIMES DAILY
Status: DISCONTINUED | OUTPATIENT
Start: 2018-01-01 | End: 2018-01-01

## 2018-01-01 RX ORDER — LEVOTHYROXINE SODIUM 50 UG/1
50 TABLET ORAL DAILY
Status: DISCONTINUED | OUTPATIENT
Start: 2018-01-01 | End: 2018-01-01

## 2018-01-01 RX ORDER — NOREPINEPHRINE BITARTRATE/D5W 4MG/250ML
PLASTIC BAG, INJECTION (ML) INTRAVENOUS
Status: COMPLETED
Start: 2018-01-01 | End: 2018-01-01

## 2018-01-01 RX ORDER — MIDAZOLAM HYDROCHLORIDE 1 MG/ML
INJECTION INTRAMUSCULAR; INTRAVENOUS
Status: COMPLETED
Start: 2018-01-01 | End: 2018-01-01

## 2018-01-01 RX ORDER — NOREPINEPHRINE BITARTRATE/D5W 4MG/250ML
0.05 PLASTIC BAG, INJECTION (ML) INTRAVENOUS CONTINUOUS
Status: DISCONTINUED | OUTPATIENT
Start: 2018-01-01 | End: 2018-01-01

## 2018-01-01 RX ORDER — ACETAMINOPHEN 325 MG/1
650 TABLET ORAL EVERY 6 HOURS PRN
Status: DISCONTINUED | OUTPATIENT
Start: 2018-01-01 | End: 2018-01-01

## 2018-01-01 RX ORDER — MIDAZOLAM HYDROCHLORIDE 1 MG/ML
2 INJECTION INTRAMUSCULAR; INTRAVENOUS
Status: DISCONTINUED | OUTPATIENT
Start: 2018-01-01 | End: 2018-01-01

## 2018-01-01 RX ADMIN — AMIODARONE HYDROCHLORIDE 0.5 MG/MIN: 1.8 INJECTION, SOLUTION INTRAVENOUS at 01:02

## 2018-01-01 RX ADMIN — MAGNESIUM SULFATE IN WATER 2 G: 40 INJECTION, SOLUTION INTRAVENOUS at 07:02

## 2018-01-01 RX ADMIN — LEVOTHYROXINE SODIUM ANHYDROUS 25 MCG: 100 INJECTION, POWDER, LYOPHILIZED, FOR SOLUTION INTRAVENOUS at 09:02

## 2018-01-01 RX ADMIN — FAMOTIDINE 20 MG: 10 INJECTION, SOLUTION INTRAVENOUS at 09:02

## 2018-01-01 RX ADMIN — IOHEXOL 100 ML: 350 INJECTION, SOLUTION INTRAVENOUS at 05:02

## 2018-01-01 RX ADMIN — ACETAMINOPHEN 1000 MG: 10 INJECTION, SOLUTION INTRAVENOUS at 05:02

## 2018-01-01 RX ADMIN — Medication 0.1 MCG/KG/MIN: at 05:02

## 2018-01-01 RX ADMIN — AMIODARONE HYDROCHLORIDE 0.5 MG/MIN: 1.8 INJECTION, SOLUTION INTRAVENOUS at 05:02

## 2018-01-01 RX ADMIN — LEVETIRACETAM 500 MG: 5 INJECTION INTRAVENOUS at 09:02

## 2018-01-01 RX ADMIN — LEVETIRACETAM 500 MG: 5 INJECTION INTRAVENOUS at 06:02

## 2018-01-01 RX ADMIN — PIPERACILLIN AND TAZOBACTAM 4.5 G: 4; .5 INJECTION, POWDER, LYOPHILIZED, FOR SOLUTION INTRAVENOUS; PARENTERAL at 08:02

## 2018-01-01 RX ADMIN — CHLORHEXIDINE GLUCONATE 15 ML: 1.2 RINSE ORAL at 09:02

## 2018-01-01 RX ADMIN — Medication 0.12 MCG/KG/MIN: at 10:02

## 2018-01-01 RX ADMIN — Medication 0.12 MCG/KG/MIN: at 02:02

## 2018-01-01 RX ADMIN — VANCOMYCIN HYDROCHLORIDE 1750 MG: 1 INJECTION, POWDER, LYOPHILIZED, FOR SOLUTION INTRAVENOUS at 09:02

## 2018-01-01 RX ADMIN — MIDAZOLAM HYDROCHLORIDE 2 MG: 1 INJECTION, SOLUTION INTRAMUSCULAR; INTRAVENOUS at 04:02

## 2018-01-01 RX ADMIN — INSULIN ASPART 2 UNITS: 100 INJECTION, SOLUTION INTRAVENOUS; SUBCUTANEOUS at 12:02

## 2018-01-01 RX ADMIN — Medication 0.12 MCG/KG/MIN: at 06:02

## 2018-01-01 RX ADMIN — AMIODARONE HYDROCHLORIDE 0.5 MG/MIN: 1.8 INJECTION, SOLUTION INTRAVENOUS at 02:02

## 2018-01-01 RX ADMIN — AMIODARONE HYDROCHLORIDE 0.5 MG/MIN: 1.8 INJECTION, SOLUTION INTRAVENOUS at 10:02

## 2018-01-01 RX ADMIN — PIPERACILLIN AND TAZOBACTAM 4.5 G: 4; .5 INJECTION, POWDER, LYOPHILIZED, FOR SOLUTION INTRAVENOUS; PARENTERAL at 09:02

## 2018-01-01 RX ADMIN — FAMOTIDINE 20 MG: 10 INJECTION, SOLUTION INTRAVENOUS at 11:02

## 2018-01-01 RX ADMIN — MIDAZOLAM HYDROCHLORIDE 2 MG: 1 INJECTION INTRAMUSCULAR; INTRAVENOUS at 04:02

## 2018-01-01 RX ADMIN — Medication 100 MCG/HR: at 04:02

## 2018-01-01 RX ADMIN — CHLORHEXIDINE GLUCONATE 15 ML: 1.2 RINSE ORAL at 11:02

## 2018-01-01 RX ADMIN — Medication 0.11 MCG/KG/MIN: at 08:02

## 2018-01-01 RX ADMIN — Medication 0.05 MCG/KG/MIN: at 08:02

## 2018-01-01 RX ADMIN — Medication 4000 MCG: at 04:02

## 2018-01-01 RX ADMIN — FUROSEMIDE 80 MG: 10 INJECTION, SOLUTION INTRAMUSCULAR; INTRAVENOUS at 09:02

## 2018-01-08 NOTE — TELEPHONE ENCOUNTER
Spoke to Ms. Quintana she stated that she just had surgery at [facility] she will follow up with Dr. Yanes in about 3 mo call back than

## 2018-02-02 PROBLEM — K22.89: Status: ACTIVE | Noted: 2018-01-01

## 2018-02-03 PROBLEM — R78.81 BACTEREMIA: Status: ACTIVE | Noted: 2018-01-01

## 2018-02-03 PROBLEM — R56.9 NEW ONSET SEIZURE: Status: ACTIVE | Noted: 2018-01-01

## 2018-02-03 PROBLEM — Z86.73 H/O ISCHEMIC RIGHT MCA STROKE: Status: ACTIVE | Noted: 2018-01-01

## 2018-02-03 PROBLEM — I46.9 PEA (PULSELESS ELECTRICAL ACTIVITY): Status: ACTIVE | Noted: 2018-01-01

## 2018-02-03 NOTE — ASSESSMENT & PLAN NOTE
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      After extensive review of the records we are unsure if patient truly has an aorto-esophageal fistula. During the first couple of days she was admitted and the possibly of a fistula was considered. She had an CT which revealed a small outpouching of the left atrium and was followed by an esophogram which was negative.     Following these studies she underwent an LP with a PEA ~ 1 hour later. Therefore at that time a CT chest was repeated which showed a focus of air is noted within the posterior wall of the left atrium. It was thought that this was the cause of her code and after discussing case with GI at OSH patient was transferred [facility].    Now, despite summary above we are unsure that this is truly the case. If such a fistula were present we would except to have some evidence of GI bleed (drop in H/H, hematemesis, melena, or BRBPR). Aside from the suspicion of the fistula, patient has multiple other issues and would therefore not proceed with endoscopic intervention at this time but rather further imaging with neurology to determine overall prognosis.    Recommendations:  --As per discussion with CTS attending we agree with further imaging (refer to their note for exact imaging study) as well as neurology input on overall prognosis  --At this point will note proceed with an endoscopic intervention

## 2018-02-03 NOTE — SUBJECTIVE & OBJECTIVE
Past Medical History:   Diagnosis Date    A-fib     CHF (congestive heart failure)     dr. olivares    Coronary artery disease     Diabetes mellitus     Dyslipidemia     Hypertension     New onset seizure 2/3/2018    Obstructive sleep apnea     Pulmonary hypertension     dr. MARIO    Thyroid disease        Past Surgical History:   Procedure Laterality Date    APPENDECTOMY      ATRIAL ABLATION SURGERY      didn't work for her afib    ELBOW SURGERY      FOOT SURGERY      HYSTERECTOMY      KNEE SURGERY         Review of patient's allergies indicates:   Allergen Reactions    Cardizem [diltiazem hcl] Hives, Shortness Of Breath and Other (See Comments)     Procardia    Procardia [nifedipine]      Low BP     Family History     Problem Relation (Age of Onset)    Heart failure Mother    Hypertension Father    Hypoglycemic Mother    Pacemaker/defibrilator Mother    Prostate cancer Father        Social History Main Topics    Smoking status: Never Smoker    Smokeless tobacco: Not on file    Alcohol use No    Drug use: No    Sexual activity: Yes     Partners: Male     Review of Systems   Unable to perform ROS: Intubated     Objective:     Vital Signs (Most Recent):  Temp: 96 °F (35.6 °C) (02/03/18 1400)  Pulse: 82 (02/03/18 1400)  Resp: 12 (02/03/18 1400)  SpO2: (!) 94 % (02/03/18 1400) Vital Signs (24h Range):  Temp:  [96 °F (35.6 °C)-107.6 °F (42 °C)] 96 °F (35.6 °C)  Pulse:  [] 82  Resp:  [11-30] 12  SpO2:  [93 %-96 %] 94 %  Arterial Line BP: ()/() 97/52     Weight: 131.7 kg (290 lb 5.5 oz) (02/03/18 1101)  Body mass index is 48.32 kg/m².      Intake/Output Summary (Last 24 hours) at 02/03/18 1421  Last data filed at 02/03/18 1400   Gross per 24 hour   Intake          1096.34 ml   Output              370 ml   Net           726.34 ml       Lines/Drains/Airways     Central Venous Catheter Line                 Port A Cath Single Lumen right subclavian -- days          Drain                  Urethral Catheter 02/03/18 0823 less than 1 day          Airway                 Airway - Non-Surgical 02/02/18 Endotracheal Tube 1 day          Arterial Line                 Arterial Line 02/02/18 Left Radial 1 day          Peripheral Intravenous Line                 Peripheral IV - Single Lumen 02/02/18 Right Upper Arm 1 day         Peripheral IV - Single Lumen 02/03/18 0525 Right Hand less than 1 day                Physical Exam   Constitutional: No distress.   HENT:   Head: Normocephalic and atraumatic.   Eyes: No scleral icterus.   Cardiovascular:   Sinus tachycardia     Pulmonary/Chest: Effort normal and breath sounds normal.   Abdominal: Soft. Bowel sounds are normal.   Not able to perform BOLIVAR as patient was being cooled due to high fever   Neurological:   Intubated   Skin: She is not diaphoretic.       Significant Labs:  CBC:   Recent Labs  Lab 02/03/18  0525   WBC 18.41*   HGB 13.5   HCT 41.5        CMP:   Recent Labs  Lab 02/03/18  0525   *   CALCIUM 8.8   ALBUMIN 3.1*   PROT 6.9   *   K 4.8   CO2 23      BUN 30*   CREATININE 2.4*   ALKPHOS 99   *   *   BILITOT 1.4*     Coagulation:   Recent Labs  Lab 02/03/18  0525   INR 1.5*       Significant Imaging:  Imaging results within the past 24 hours have been reviewed.

## 2018-02-03 NOTE — ASSESSMENT & PLAN NOTE
S/p ablation 1/5/18    - continue amiodarone gtt  - holding eliquis possibly procedure today   - 2d echo

## 2018-02-03 NOTE — CONSULTS
"  [facility]  Adult Nutrition  Consult Note    SUMMARY     Recommendations    1. TPN recommendations: 345g D, 125g AA @ 55 mL/hr + 250 mL 20% lipids to provide 2173 calories, 125 g of protein, 1320 mL fluid (GIR: 1.82).    - Check TRIG weekly while on Lipids; Electrolytes  to be customed by MD & pharmacy.   2. TF recommendations: Diabetisource @ 75 mL/hr to provide 2160 calories, 108 g of protein, 1472 mL fluid.   - Hold for residuals >500 mL; additional fluid per MD.  3. If able to extubate & advance diet, recommend cardiac, 2200 kcal ADA diet (texture per SLP).   4. RD to monitor & follow-up.    Goals: Meet % EEN, EPN  Nutrition Goal Status: new  Communication of RD Recs: reviewed with RN    Reason for Assessment    Reason for Assessment: physician consult  Diagnosis: other (see comments) (Aorto-esophageal fistula)  Relevent Medical History: CHF, DM, HTN   Interdisciplinary Rounds: did not attend     General Information Comments: Pt intubated, sedated.   Nutrition Discharge Planning: Unable to determine    Nutrition Prescription Ordered    Current Diet Order: NPO     Nutrition/Diet History    Patient Reported Diet/Restrictions/Preferences: other (see comments) (MERCED)     Factors Affecting Nutritional Intake: NPO, on mechanical ventilation    Labs/Tests/Procedures/Meds    Pertinent Labs Reviewed: reviewed, pertinent  Pertinent Labs Comments: Na 135, BUN 30, Creat 2.4, GFR 20.6, Gluc 129-213, , , A1C 7.4  Pertinent Medications Reviewed: reviewed, pertinent  Pertinent Medications Comments: Amiodarone, Levophed, Vanc    Physical Findings    Overall Physical Appearance: obese  Oral/Mouth Cavity: WDL  Skin: intact    Anthropometrics    Temp: (!) 100.8 °F (38.2 °C)     Height: 5' 5" (165.1 cm)  Weight Method: Bed Scale  Weight: 131.7 kg (290 lb 5.5 oz)     Ideal Body Weight (IBW), Female: 125 lb     % Ideal Body Weight, Female (lb): 232.28 lb  BMI (Calculated): 48.4  BMI Grade: " greater than 40 - morbid obesity     Usual Body Weight (UBW), kg:  (MERCED)    Estimated/Assessed Needs    Weight Used For Calorie Calculations: 131.7 kg (290 lb 5.5 oz)      Energy Calorie Requirements (kcal): 2173 kcal/d  Energy Need Method: [facility]     Weight Used For Protein Calculations: 56.8 kg (125 lb 3.5 oz)  Protein Requirements: 114-142 g/d (2-2.5 g/kg IBW)     Fluid Need Method: RDA Method, other (see comments) (Per MD or 1 mL/kcal)    CHO Requirement: 50% total kcals     Assessment and Plan    * Aorto-esophageal fistula      Nutrition Problem  Inadequate energy intake    Related to (etiology):   Inability to consume sufficient energy    Signs and Symptoms (as evidenced by):   NPO with no alternate means of nutrition     Nutrition Diagnosis Status:   New        Monitor and Evaluation    Food and Nutrient Intake: energy intake, food and beverage intake, enteral nutrition intake, parenteral nutrition intake  Food and Nutrient Adminstration: diet order, enteral and parenteral nutrition administration     Physical Activity and Function: nutrition-related ADLs and IADLs  Anthropometric Measurements: weight, weight change  Biochemical Data, Medical Tests and Procedures: lipid profile, inflammatory profile, glucose/endocrine profile, gastrointestinal profile, electrolyte and renal panel  Nutrition-Focused Physical Findings: overall appearance    Nutrition Risk    Level of Risk: other (see comments) (2x/week)    Nutrition Follow-Up    RD Follow-up?: Yes

## 2018-02-03 NOTE — ASSESSMENT & PLAN NOTE
67 y.o F with a history significant for pulmonary HTN on home milrinone, CAD s/p EN, HFpEF, CIARAN, morbid obesity, persist afib s/p PVI ablation on 1/5/18 who is admitted for atrial-esophageal fistula and lactobacillus bacteremia. Source is either the line or atrial-esophageal fistula    -lactobacillus can be resistant to vanco  -continue zosyn, this should cover lactobacillus and other bacteria that might seed the blood stream from the fistula  -would repeat blood cxs here  -await surgical plan

## 2018-02-03 NOTE — ASSESSMENT & PLAN NOTE
Nutrition Problem  Inadequate energy intake    Related to (etiology):   Inability to consume sufficient energy    Signs and Symptoms (as evidenced by):   NPO with no alternate means of nutrition     Nutrition Diagnosis Status:   New

## 2018-02-03 NOTE — PROGRESS NOTES
Pt received from [facility], placed on monitor. Intubated and unresponsive on amiodarone gtt @1mg/min, lee in place; MD [facility] notified on arrival to unit rectal temp 107.6, cooling pad and ice applied to pt. See flow sheet for vs and remaining assessment data.

## 2018-02-03 NOTE — CARE UPDATE
ICU UPDATE    Updated the family on pt's condition and anticipated studies. Per CTS, pt requires CT chest with IV CONTRAST for further eval. Cr of 2.2,GFR of 20s. Renal adverse effects of IV cont were discussed with , eda in setting of MONET. Benefits outweigh potential further risks at this time.  and family are OK with possible HD needs post contrast.     Trialysis CVC consent discussed and signed    Paige Rubio MD  Internal Medicine PGY-3  Pager [phone]

## 2018-02-03 NOTE — CONSULTS
[facility]  Infectious Disease  Consult Note    Patient Name: Rena Quintana  MRN: [id number]  Admission Date: 2/3/2018  Hospital Length of Stay: 0 days  Attending Physician: Navid Fiore MD  Primary Care Provider: Yaritza Yanes MD     Isolation Status: No active isolations    Patient information was obtained from past medical records.      Inpatient consult to Infectious Diseases  Consult performed by: EVARISTO MAHARAJ  Consult ordered by: TREVON JOHNSON  Reason for consult: lactobacillus bacteremia         Assessment/Plan:     Bacteremia    67 y.o F with a history significant for pulmonary HTN on home milrinone, CAD s/p EN, HFpEF, CIARAN, morbid obesity, persist afib s/p PVI ablation on 1/5/18 who is admitted for atrial-esophageal fistula and lactobacillus bacteremia. Source is either the line or atrial-esophageal fistula    -lactobacillus can be resistant to vanco  -continue zosyn, this should cover lactobacillus and other bacteria that might seed the blood stream from the fistula  -would repeat blood cxs here  -await surgical plan  -since blood cxs cleared quickly can leave line in if it is needed but optimally would remove            Thank you for your consult. I will follow-up with patient. Please contact us if you have any additional questions.    Evaristo Maharaj MD  Infectious Disease  [facility]    Subjective:     Principal Problem: Aorto-esophageal fistula    HPI: Patient is intubated so hx is from chart    67 y.o F with a history significant for pulmonary HTN on home milrinone, CAD s/p EN, HFpEF, CIARAN, morbid obesity, persist afib s/p PVI ablation on 1/5/18 and then presented on 1/29 with symptoms of fever, also had N/V/D.      In the ED she had two witnessed seizures and was started on keppra. EEG no epileptiform abnormalities captured. MRI was significant for an area of restricted diffusion in her right frontoparietal lobe that was predominantly cortical  and spared the white matter, but with T2 changes that appear c/w cytotoxic edema.       She was found to be bacteremic (lactobacillus in 2/2 sets of blood cxs). There was concern for bacterial meningitis vs stroke. LP was not concerning for bacterial meningitis and HSV pcr was negative. Initially the PICC line line was thought to be the source of the infection but an atrial-esophageal fistula was also on the differential. On 1/30 esophagram was performed which did not show extravasation to suggest fistula. Today (2/2/18), she had an LP performed and one hour after suffered a PEA and underwent ACLS protocol with ROCS. 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      Patient was evaluated by GI at [facility], who recommended transferring the patient to [facility]; as they do not have personal to assist with esophageal stenting.       Patient received vanco, keila, zosyn, and acyclovir at OSH but here has been narrowed to zosyn. Repeat blood cxs have been negative. Line was not removed     Past Medical History:   Diagnosis Date    A-fib     CHF (congestive heart failure)     dr. olivares    Coronary artery disease     Diabetes mellitus     Dyslipidemia     Hypertension     New onset seizure 2/3/2018    Obstructive sleep apnea     Pulmonary hypertension     dr. MARIO    Thyroid disease        Past Surgical History:   Procedure Laterality Date    APPENDECTOMY      ATRIAL ABLATION SURGERY      didn't work for her afib    ELBOW SURGERY      FOOT SURGERY      HYSTERECTOMY      KNEE SURGERY         Review of patient's allergies indicates:   Allergen Reactions    Cardizem [diltiazem hcl] Hives, Shortness Of Breath and Other (See Comments)     Procardia    Procardia [nifedipine]      Low BP       Medications:  Prescriptions Prior to Admission   Medication Sig    amiodarone (PACERONE) 200 MG Tab      amlodipine (NORVASC) 10 MG tablet Take 10 mg by mouth once daily.    atorvastatin (LIPITOR) 40 MG tablet Take 40 mg by mouth once daily.    ciprofloxacin HCl (CIPRO) 500 MG tablet Take 1 tablet (500 mg total) by mouth 2 (two) times daily.    ELIQUIS 5 mg Tab 5 mg 2 (two) times daily.     fluticasone (FLONASE) 50 mcg/actuation nasal spray 1 spray by Nasal route as needed.     furosemide (LASIX) 40 MG tablet Take 1 tablet (40 mg total) by mouth once daily. (Patient taking differently: Take 40 mg by mouth as needed. )    glimepiride (AMARYL) 4 MG tablet Take 4 mg by mouth daily with breakfast.     hydrALAZINE (APRESOLINE) 100 MG tablet Take 100 mg by mouth 3 (three) times daily.    JANUVIA 100 mg Tab Take 100 mg by mouth once daily.     levothyroxine (SYNTHROID) 50 MCG tablet Take 50 mcg by mouth once daily.    metformin (GLUCOPHAGE) 1000 MG tablet Take 1,000 mg by mouth 2 (two) times daily with meals.    metoprolol succinate (TOPROL-XL) 100 MG 24 hr tablet Take 100 mg by mouth once daily.     mirabegron 50 mg Tb24 Take 1 tablet (50 mg total) by mouth once daily.    multivitamin (ONE DAILY MULTIVITAMIN) per tablet Take 1 tablet by mouth once daily.    nitrofurantoin, macrocrystal-monohydrate, (MACROBID) 100 MG capsule Take 1 capsule (100 mg total) by mouth 2 (two) times daily.    oxybutynin (DITROPAN XL) 15 MG TR24 Take 5 mg by mouth once daily.    sertraline (ZOLOFT) 50 MG tablet Take 50 mg by mouth once daily.     sildenafil (REVATIO) 20 mg Tab Take 20 mg by mouth 3 (three) times daily. Discontinued 9/13/13--to start Adcirca    spironolactone (ALDACTONE) 100 MG tablet Take 100 mg by mouth once daily.    valsartan (DIOVAN) 320 MG tablet Take 320 mg by mouth once daily.    vitamin D 1000 units Tab Take 185 mg by mouth once daily.     Antibiotics     Start     Stop Route Frequency Ordered    02/03/18 0800  piperacillin-tazobactam 4.5 g in sodium chloride 0.9% 100 mL IVPB (ready to mix system)       -- IV Every 12 hours (non-standard times) 02/03/18 0532        Antifungals     None        Antivirals     None             There is no immunization history on file for this patient.    Family History     Problem Relation (Age of Onset)    Heart failure Mother    Hypertension Father    Hypoglycemic Mother    Pacemaker/defibrilator Mother    Prostate cancer Father        Social History     Social History    Marital status:      Spouse name: N/A    Number of children: N/A    Years of education: N/A     Occupational History    Retired      Social History Main Topics    Smoking status: Never Smoker    Smokeless tobacco: Not on file    Alcohol use No    Drug use: No    Sexual activity: Yes     Partners: Male     Other Topics Concern    Not on file     Social History Narrative    No narrative on file     Review of Systems   Unable to perform ROS: Intubated     Objective:     Vital Signs (Most Recent):  Temp: (!) 100.8 °F (38.2 °C) (02/03/18 1101)  Pulse: 98 (02/03/18 1110)  Resp: 13 (02/03/18 1110)  SpO2: 95 % (02/03/18 1110) Vital Signs (24h Range):  Temp:  [100.8 °F (38.2 °C)-107.6 °F (42 °C)] 100.8 °F (38.2 °C)  Pulse:  [] 98  Resp:  [11-30] 13  SpO2:  [93 %-96 %] 95 %  Arterial Line BP: ()/() 95/51     Weight: 131.7 kg (290 lb 5.5 oz)  Body mass index is 48.32 kg/m².    Estimated Creatinine Clearance: 32.1 mL/min (based on SCr of 2.4 mg/dL (H)).    Physical Exam   Constitutional: She appears well-developed and well-nourished. She is intubated.   HENT:   Head: Normocephalic and atraumatic.   Eyes: Pupils are equal, round, and reactive to light.   Cardiovascular: Normal rate and normal heart sounds.    Pulmonary/Chest: Breath sounds normal. She is intubated.   Abdominal: Soft.   Neurological:   sedated       Significant Labs: All pertinent labs within the past 24 hours have been reviewed.    Significant Imaging: I have reviewed all pertinent imaging results/findings within the past  24 hours.

## 2018-02-03 NOTE — HPI
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      Summary of events (after review of records):  1/5/18: pulmonary vein isolation for symptomatic afib despite medical therapy; per notes patient did well and was discharged on 1/6/18 1/29/18: Presents to the ED after  1 day history of fever (as high as 102), malaise, N/V/D, and substernal chest pain (stabbing and radiating to the back and both arms). In the ED found to be in Afib with RVR which responded to amio bolus X 2. She was also found to have 2 episode of seizure like activity (preferential gaze with arm stiffening).    1/29/18: CT brain w/o at 10:31 pm  Increased hypodensity within the right occipital lobe for which acute ischemia is not excluded    1/30/18: TTE  For evaluation of HF  EF of 60-65%, LA moderately dilated, ICV dilated    1/30/18: EEG   No seizures captured but abnormal EEG with higher amplitude slower activity on the left could be seen in underlying structural lesion  Excessive beta activity can be seen in the setting of sedating medications    1/30/18: MRI brain w/o at 02:15 am  Limited exam with acute to subacute ischemia within the right temporo-parietal occipital region  Mild white mater changes likely reflecting chronic microvascular changes    1/30/18: CT Angio Chest w/wo contrast 1:15 pm  Small outpouching of the left atrium, adjacent small collection of air is also seen potentially contained within the esophageal lumen, while an atrial esophageal fistula accounting for the findings noted is not entirely excluded there is no definitive evidence of contrast extravasation demonstrated  Small airway disease  7 mm right lower lobe pulmonary nodule  BL thyroid nodules    1/30/18 : UGI/Barium 1705 at 5:15 pm  The esophagus demonstrates smooth mucosa  without evidence of abnormal stricture, narrowing, outpouching, or filling defect  Contrast was identified transiting the esophagus into the stomach through a normal GE junction. No evidence of extravasation or stricture was seen    2/2/18: LP done at 1:00 pm, nurse checked on patient at 1:30 pm, at 2:00 pm  noticed her lips were blue  Code called, ROSC after 10 minutes    2/2/18: TTE  after post code  EF 40-45%    2/2/18: CT brain without contrast a 3:19 pm  Redemonstration of evolving vascular insult within the right occipito-parietal area.    2/2/18: CT Chest PE protocol at 3:19 pm  A focus of air is noted within the posterior wall of the left atrium, presumably site of ablation finding can be seen with clinical concern for atrial-esophageal fistula  No evidence of central PE  BL airspace consolidation of the lungs    After her code and after CT chest PE protocol. Critical care team thought patient could indeed have an atrial-esophageal fistula. Spoke to GI who felt that in some instance an esophageal stent could help and therefore was transferred here for further evaluation as [facility] did not have capability for stent placement.

## 2018-02-03 NOTE — ASSESSMENT & PLAN NOTE
Lactobacillus; 2 sets blood cultures positive from [facility]    - repeat blood cultures   - started Vanc and zosyn  - monitor CBC

## 2018-02-03 NOTE — TREATMENT PLAN
Treatment Plan  02/03/2018  9:53 AM    Patient seen and case discussed with attending.  Our recommendations at this point are to consult CTS and have them weigh in on the case and determine if there is truly a fistula/intervention is needed.  If AES intervention is needed than would consider placing an esophageal stent.  Full consult note to follow.    Brinda Kirk M.D.  Gastroenterology Fellow, PGY-IV  Pager: [phone]  [facility]

## 2018-02-03 NOTE — CONSULTS
[facility]  Gastroenterology  Consult Note    Patient Name: Rena Quintana  MRN: [id number]  Admission Date: 2/3/2018  Hospital Length of Stay: 0 days  Code Status: Full Code   Attending Provider: Farzad Moss MD   Consulting Provider: Emily Carlson MD  Primary Care Physician: Yaritza Yanes MD  Principal Problem:Aorto-esophageal fistula    Inpatient consult to [facility]  Consult performed by: EMILY CARLSON  Consult ordered by: EMILY CARLSON        Subjective:     HPI:  67 year old female with a history of HTN, HLD, CIARAN, CAD s/p EN, CHF on home milrinone as well as Pulmonary hypertension on who [facility] is being consulted for esophageal stent placement in the setting of a suspected atrial-esophageal fistula.      Summary of events (after review of records):  1/5/18: pulmonary vein isolation for symptomatic afib despite medical therapy; per notes patient did well and was discharged on 1/6/18 1/29/18: Presents to the ED after  1 day history of fever (as high as 102), malaise, N/V/D, and substernal chest pain (stabbing and radiating to the back and both arms). In the ED found to be in Afib with RVR which responded to amio bolus X 2. She was also found to have 2 episode of seizure like activity (preferential gaze with arm stiffening).    1/29/18: CT brain w/o at 10:31 pm  Increased hypodensity within the right occipital lobe for which acute ischemia is not excluded    1/30/18: TTE  For evaluation of HF  EF of 60-65%, LA moderately dilated, ICV dilated    1/30/18: EEG   No seizures captured but abnormal EEG with higher amplitude slower activity on the left could be seen in underlying structural lesion  Excessive beta activity can be seen in the setting of sedating medications    1/30/18: MRI brain w/o at 02:15 am  Limited exam with acute to subacute ischemia within the right temporo-parietal occipital region  Mild white mater changes likely reflecting chronic microvascular  changes    1/30/18: CT Angio Chest w/wo contrast 1:15 pm  Small outpouching of the left atrium, adjacent small collection of air is also seen potentially contained within the esophageal lumen, while an atrial esophageal fistula accounting for the findings noted is not entirely excluded there is no definitive evidence of contrast extravasation demonstrated  Small airway disease  7 mm right lower lobe pulmonary nodule  BL thyroid nodules    1/30/18 : UGI/Barium 1705 at 5:15 pm  The esophagus demonstrates smooth mucosa without evidence of abnormal stricture, narrowing, outpouching, or filling defect  Contrast was identified transiting the esophagus into the stomach through a normal GE junction. No evidence of extravasation or stricture was seen    2/2/18: LP done at 1:00 pm, nurse checked on patient at 1:30 pm, at 2:00 pm  noticed her lips were blue  Code called, ROSC after 10 minutes    2/2/18: TTE  after post code  EF 40-45%    2/2/18: CT brain without contrast a 3:19 pm  Redemonstration of evolving vascular insult within the right occipito-parietal area.    2/2/18: CT Chest PE protocol at 3:19 pm  A focus of air is noted within the posterior wall of the left atrium, presumably site of ablation finding can be seen with clinical concern for atrial-esophageal fistula  No evidence of central PE  BL airspace consolidation of the lungs    After her code and after CT chest PE protocol. Critical care team thought patient could indeed have an atrial-esophageal fistula. Spoke to GI who felt that in some instance an esophageal stent could help and therefore was transferred here for further evaluation as [facility] did not have capability for stent placement.            Past Medical History:   Diagnosis Date    A-fib     CHF (congestive heart failure)     dr. olivares    Coronary artery disease     Diabetes mellitus     Dyslipidemia     Hypertension     New onset seizure 2/3/2018    Obstructive sleep apnea      Pulmonary hypertension     dr. MARIO    Thyroid disease        Past Surgical History:   Procedure Laterality Date    APPENDECTOMY      ATRIAL ABLATION SURGERY      didn't work for her afib    ELBOW SURGERY      FOOT SURGERY      HYSTERECTOMY      KNEE SURGERY         Review of patient's allergies indicates:   Allergen Reactions    Cardizem [diltiazem hcl] Hives, Shortness Of Breath and Other (See Comments)     Procardia    Procardia [nifedipine]      Low BP     Family History     Problem Relation (Age of Onset)    Heart failure Mother    Hypertension Father    Hypoglycemic Mother    Pacemaker/defibrilator Mother    Prostate cancer Father        Social History Main Topics    Smoking status: Never Smoker    Smokeless tobacco: Not on file    Alcohol use No    Drug use: No    Sexual activity: Yes     Partners: Male     Review of Systems   Unable to perform ROS: Intubated     Objective:     Vital Signs (Most Recent):  Temp: 96 °F (35.6 °C) (02/03/18 1400)  Pulse: 82 (02/03/18 1400)  Resp: 12 (02/03/18 1400)  SpO2: (!) 94 % (02/03/18 1400) Vital Signs (24h Range):  Temp:  [96 °F (35.6 °C)-107.6 °F (42 °C)] 96 °F (35.6 °C)  Pulse:  [] 82  Resp:  [11-30] 12  SpO2:  [93 %-96 %] 94 %  Arterial Line BP: ()/() 97/52     Weight: 131.7 kg (290 lb 5.5 oz) (02/03/18 1101)  Body mass index is 48.32 kg/m².      Intake/Output Summary (Last 24 hours) at 02/03/18 1421  Last data filed at 02/03/18 1400   Gross per 24 hour   Intake          1096.34 ml   Output              370 ml   Net           726.34 ml       Lines/Drains/Airways     Central Venous Catheter Line                 Port A Cath Single Lumen right subclavian -- days          Drain                 Urethral Catheter 02/03/18 0823 less than 1 day          Airway                 Airway - Non-Surgical 02/02/18 Endotracheal Tube 1 day          Arterial Line                 Arterial Line 02/02/18 Left Radial 1 day          Peripheral Intravenous  Line                 Peripheral IV - Single Lumen 02/02/18 Right Upper Arm 1 day         Peripheral IV - Single Lumen 02/03/18 0525 Right Hand less than 1 day                Physical Exam   Constitutional: No distress.   HENT:   Head: Normocephalic and atraumatic.   Eyes: No scleral icterus.   Cardiovascular:   Sinus tachycardia     Pulmonary/Chest: Effort normal and breath sounds normal.   Abdominal: Soft. Bowel sounds are normal.   Not able to perform BOLIVAR as patient was being cooled due to high fever   Neurological:   Intubated   Skin: She is not diaphoretic.       Significant Labs:  CBC:   Recent Labs  Lab 02/03/18  0525   WBC 18.41*   HGB 13.5   HCT 41.5        CMP:   Recent Labs  Lab 02/03/18  0525   *   CALCIUM 8.8   ALBUMIN 3.1*   PROT 6.9   *   K 4.8   CO2 23      BUN 30*   CREATININE 2.4*   ALKPHOS 99   *   *   BILITOT 1.4*     Coagulation:   Recent Labs  Lab 02/03/18  0525   INR 1.5*       Significant Imaging:  Imaging results within the past 24 hours have been reviewed.    Assessment/Plan:     * Suspected atrial-esophageal fistula    67 year old female with a history of HTN, HLD, CIARAN, CAD s/p EN, CHF on home milrinone as well as Pulmonary hypertension on who [facility] is being consulted for esophageal stent placement in the setting of a suspected atrial-esophageal fistula.      After extensive review of the records we are unsure if patient truly has an atrial-esophageal fistula. During the first couple of days she was admitted and the possibly of a fistula was considered. She had an CT which revealed a small outpouching of the left atrium and was followed by an esophogram which was negative.     Following these studies she underwent an LP with a PEA ~ 1 hour later. Therefore at that time a CT chest was repeated which showed a focus of air is noted within the posterior wall of the left atrium. It was thought that this (atrial-esophageal fistula) was the cause of her code  and after discussing case with GI at OSH patient was transferred her to [facility].    Now, despite summary above we are unsure that this is truly the case (the at patient truly has a fistula). If such a fistula were present we would except to have some evidence of GI bleed (drop in H/H, hematemesis, melena, or BRBPR). Aside from the suspicion of the fistula, patient has multiple other issues and would therefore not proceed with endoscopic intervention at this time but rather further imaging with neurology to determine overall prognosis.    Recommendations:  --As per discussion with CTS attending we agree with further imaging (refer to their note for exact imaging study) as well as neurology input on overall prognosis  --At this point will note proceed with an endoscopic intervention            Thank you for your consult. I will follow-up with patient. Please contact us if you have any additional questions.    Brinda Kirk M.D.  Gastroenterology Fellow, PGY-IV  Pager: [phone]  [facility]

## 2018-02-03 NOTE — HPI
Ms. Quintana is a 67 y.o F with a history significant for pulmonary HTN on home milrinone, CAD s/p EN, HFpEF, CIARAN, morbid obesity, persist afib s/p PVI ablation on 1/5/18 and then presented on 1/29 with symptoms of fever, also had N/V/D.     In the ED she had two witnessed seizures and was started on keppra. EEG no epileptiform abnormalities captured. MRI was significant for an area of restricted diffusion in her right frontoparietal lobe that was predominantly cortical and spared the white matter, but with T2 changes that appear c/w cytotoxic edema.      She was found to be bacteremic (lactobascillus). There was concern for bacterial meningitis vs. Stroke. Initially the PICC line line was thought to be the source of the infection but an atrial-esophageal fistula was also on the differential. On 1/30 esophagram was performed which did not show extravasation to suggest fistula. Today (2/2/18), she had an LP performed and one hour after suffered a PEA and underwent ACLS protocol with ROCS. 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     Patient was evaluated by GI at [facility], who recommended transferring the patient to [facility]; as they do not have personal to assist with esophageal stenting.

## 2018-02-03 NOTE — SUBJECTIVE & OBJECTIVE
Past Medical History:   Diagnosis Date    A-fib     CHF (congestive heart failure)     dr. olivares    Coronary artery disease     Diabetes mellitus     Dyslipidemia     Hypertension     New onset seizure 2/3/2018    Obstructive sleep apnea     Pulmonary hypertension     dr. MARIO    Thyroid disease        Past Surgical History:   Procedure Laterality Date    APPENDECTOMY      ATRIAL ABLATION SURGERY      didn't work for her afib    ELBOW SURGERY      FOOT SURGERY      HYSTERECTOMY      KNEE SURGERY         Review of patient's allergies indicates:   Allergen Reactions    Cardizem [diltiazem hcl] Hives, Shortness Of Breath and Other (See Comments)     Procardia    Procardia [nifedipine]      Low BP       Medications:  Prescriptions Prior to Admission   Medication Sig    amiodarone (PACERONE) 200 MG Tab     amlodipine (NORVASC) 10 MG tablet Take 10 mg by mouth once daily.    atorvastatin (LIPITOR) 40 MG tablet Take 40 mg by mouth once daily.    ciprofloxacin HCl (CIPRO) 500 MG tablet Take 1 tablet (500 mg total) by mouth 2 (two) times daily.    ELIQUIS 5 mg Tab 5 mg 2 (two) times daily.     fluticasone (FLONASE) 50 mcg/actuation nasal spray 1 spray by Nasal route as needed.     furosemide (LASIX) 40 MG tablet Take 1 tablet (40 mg total) by mouth once daily. (Patient taking differently: Take 40 mg by mouth as needed. )    glimepiride (AMARYL) 4 MG tablet Take 4 mg by mouth daily with breakfast.     hydrALAZINE (APRESOLINE) 100 MG tablet Take 100 mg by mouth 3 (three) times daily.    JANUVIA 100 mg Tab Take 100 mg by mouth once daily.     levothyroxine (SYNTHROID) 50 MCG tablet Take 50 mcg by mouth once daily.    metformin (GLUCOPHAGE) 1000 MG tablet Take 1,000 mg by mouth 2 (two) times daily with meals.    metoprolol succinate (TOPROL-XL) 100 MG 24 hr tablet Take 100 mg by mouth once daily.     mirabegron 50 mg Tb24 Take 1 tablet (50 mg total) by mouth once daily.    multivitamin (ONE  DAILY MULTIVITAMIN) per tablet Take 1 tablet by mouth once daily.    nitrofurantoin, macrocrystal-monohydrate, (MACROBID) 100 MG capsule Take 1 capsule (100 mg total) by mouth 2 (two) times daily.    oxybutynin (DITROPAN XL) 15 MG TR24 Take 5 mg by mouth once daily.    sertraline (ZOLOFT) 50 MG tablet Take 50 mg by mouth once daily.     sildenafil (REVATIO) 20 mg Tab Take 20 mg by mouth 3 (three) times daily. Discontinued 9/13/13--to start Adcirca    spironolactone (ALDACTONE) 100 MG tablet Take 100 mg by mouth once daily.    valsartan (DIOVAN) 320 MG tablet Take 320 mg by mouth once daily.    vitamin D 1000 units Tab Take 185 mg by mouth once daily.     Antibiotics     Start     Stop Route Frequency Ordered    02/03/18 0800  piperacillin-tazobactam 4.5 g in sodium chloride 0.9% 100 mL IVPB (ready to mix system)      -- IV Every 12 hours (non-standard times) 02/03/18 0532        Antifungals     None        Antivirals     None             There is no immunization history on file for this patient.    Family History     Problem Relation (Age of Onset)    Heart failure Mother    Hypertension Father    Hypoglycemic Mother    Pacemaker/defibrilator Mother    Prostate cancer Father        Social History     Social History    Marital status:      Spouse name: N/A    Number of children: N/A    Years of education: N/A     Occupational History    Retired      Social History Main Topics    Smoking status: Never Smoker    Smokeless tobacco: Not on file    Alcohol use No    Drug use: No    Sexual activity: Yes     Partners: Male     Other Topics Concern    Not on file     Social History Narrative    No narrative on file     Review of Systems   Unable to perform ROS: Intubated     Objective:     Vital Signs (Most Recent):  Temp: (!) 100.8 °F (38.2 °C) (02/03/18 1101)  Pulse: 98 (02/03/18 1110)  Resp: 13 (02/03/18 1110)  SpO2: 95 % (02/03/18 1110) Vital Signs (24h Range):  Temp:  [100.8 °F (38.2 °C)-107.6  °F (42 °C)] 100.8 °F (38.2 °C)  Pulse:  [] 98  Resp:  [11-30] 13  SpO2:  [93 %-96 %] 95 %  Arterial Line BP: ()/() 95/51     Weight: 131.7 kg (290 lb 5.5 oz)  Body mass index is 48.32 kg/m².    Estimated Creatinine Clearance: 32.1 mL/min (based on SCr of 2.4 mg/dL (H)).    Physical Exam   Constitutional: She appears well-developed and well-nourished. She is intubated.   HENT:   Head: Normocephalic and atraumatic.   Eyes: Pupils are equal, round, and reactive to light.   Cardiovascular: Normal rate and normal heart sounds.    Pulmonary/Chest: Breath sounds normal. She is intubated.   Abdominal: Soft.   Neurological:   sedated       Significant Labs: All pertinent labs within the past 24 hours have been reviewed.    Significant Imaging: I have reviewed all pertinent imaging results/findings within the past 24 hours.

## 2018-02-03 NOTE — SUBJECTIVE & OBJECTIVE
Past Medical History:   Diagnosis Date    A-fib     CHF (congestive heart failure)     dr. olivares    Coronary artery disease     Diabetes mellitus     Dyslipidemia     Hypertension     Obstructive sleep apnea     Pulmonary hypertension     dr. MARIO    Thyroid disease        Past Surgical History:   Procedure Laterality Date    APPENDECTOMY      ATRIAL ABLATION SURGERY      didn't work for her afib    ELBOW SURGERY      FOOT SURGERY      HYSTERECTOMY      KNEE SURGERY         Review of patient's allergies indicates:   Allergen Reactions    Cardizem [diltiazem hcl] Hives, Shortness Of Breath and Other (See Comments)     Procardia    Procardia [nifedipine]      Low BP       Family History     Problem Relation (Age of Onset)    Heart failure Mother    Hypertension Father    Hypoglycemic Mother    Pacemaker/defibrilator Mother    Prostate cancer Father        Social History Main Topics    Smoking status: Never Smoker    Smokeless tobacco: Not on file    Alcohol use No    Drug use: No    Sexual activity: Yes     Partners: Male      Review of Systems   Unable to perform ROS: Intubated     Objective:     Vital Signs (Most Recent):  Temp: (!) 107.6 °F (42 °C) (02/03/18 0515)  Pulse: (!) 135 (02/03/18 0552)  Resp: 14 (02/03/18 0552)  SpO2: 96 % (02/03/18 0552) Vital Signs (24h Range):  Temp:  [107.6 °F (42 °C)] 107.6 °F (42 °C)  Pulse:  [135-138] 135  Resp:  [14-18] 14  SpO2:  [95 %-96 %] 96 %  Arterial Line BP: (130)/(130) 130/130   Weight: 131.7 kg (290 lb 5.5 oz)  Body mass index is 48.32 kg/m².      Intake/Output Summary (Last 24 hours) at 02/03/18 0621  Last data filed at 02/03/18 0619   Gross per 24 hour   Intake              200 ml   Output                0 ml   Net              200 ml       Physical Exam   Constitutional: She is intubated.   Eyes: Pupils are equal, round, and reactive to light.   Pulmonary/Chest: She is intubated.       Vents:  Vent Mode: A/C (02/03/18 0552)  Ventilator  Initiated: Yes (02/03/18 0552)  Set Rate: 12 bmp (02/03/18 0552)  Vt Set: 450 mL (02/03/18 0552)  PEEP/CPAP: 5 cmH20 (02/03/18 0552)  Oxygen Concentration (%): 50 (02/03/18 0552)  Peak Airway Pressure: 29 cmH2O (02/03/18 0552)  Total Ve: 6.61 mL (02/03/18 0552)  F/VT Ratio<105 (RSBI): (!) 30.7 (02/03/18 0552)  Lines/Drains/Airways     Central Venous Catheter Line                 Port A Cath Single Lumen right subclavian -- days          Drain                 Urethral Catheter 02/02/18 1 day          Airway                 Airway - Non-Surgical 02/02/18 Endotracheal Tube 1 day          Arterial Line                 Arterial Line 02/02/18 Left Radial 1 day          Peripheral Intravenous Line                 Peripheral IV - Single Lumen 02/02/18 Right Upper Arm 1 day         Peripheral IV - Single Lumen 02/03/18 0525 Right Hand less than 1 day              Significant Labs:    CBC/Anemia Profile:    Recent Labs  Lab 02/03/18 0525   WBC 18.41*   HGB 13.5   HCT 41.5      MCV 86   RDW 15.2*        Chemistries:    Recent Labs  Lab 02/03/18 0525   *   K 4.8      CO2 23   BUN 30*   CREATININE 2.4*   CALCIUM 8.8   ALBUMIN 3.1*   PROT 6.9   BILITOT 1.4*   ALKPHOS 99   *   *   MG 1.5*   PHOS 2.3*       ABGs:   Recent Labs  Lab 02/03/18 0552   PH 7.449   PCO2 38.6   HCO3 26.7   POCSATURATED 97   BE 3     Blood Culture: No results for input(s): LABBLOO in the last 48 hours.  CMP:   Recent Labs  Lab 02/03/18 0525   *   K 4.8      CO2 23   *   BUN 30*   CREATININE 2.4*   CALCIUM 8.8   PROT 6.9   ALBUMIN 3.1*   BILITOT 1.4*   ALKPHOS 99   *   *   ANIONGAP 12   EGFRNONAA 20.6*     Coagulation:   Recent Labs  Lab 02/03/18 0525   INR 1.5*     Lactic Acid:   Recent Labs  Lab 02/03/18 0525   LACTATE 1.9     Lipid Panel: No results for input(s): CHOL, HDL, LDLCALC, TRIG, CHOLHDL in the last 48 hours.  Urine Culture: No results for input(s): LABURIN in the last 48  hours.  Urine Studies: No results for input(s): COLORU, APPEARANCEUA, PHUR, SPECGRAV, PROTEINUA, GLUCUA, KETONESU, BILIRUBINUA, OCCULTUA, NITRITE, UROBILINOGEN, LEUKOCYTESUR, RBCUA, WBCUA, BACTERIA, SQUAMEPITHEL, HYALINECASTS in the last 48 hours.    Invalid input(s): NICA    Significant Imaging: I have reviewed all pertinent imaging results/findings within the past 24 hours.

## 2018-02-03 NOTE — PLAN OF CARE
Problem: Patient Care Overview  Goal: Plan of Care Review  Outcome: Ongoing (interventions implemented as appropriate)  Recommendations     1. TPN recommendations: 345g D, 125g AA @ 55 mL/hr + 250 mL 20% lipids to provide 2173 calories, 125 g of protein, 1320 mL fluid (GIR: 1.82).               - Check TRIG weekly while on Lipids; Electrolytes  to be customed by MD & pharmacy.   2. TF recommendations: Diabetisource @ 75 mL/hr to provide 2160 calories, 108 g of protein, 1472 mL fluid.              - Hold for residuals >500 mL; additional fluid per MD.  3. If able to extubate & advance diet, recommend cardiac, 2200 kcal ADA diet (texture per SLP).   4. RD to monitor & follow-up.

## 2018-02-03 NOTE — H&P
[facility]  Critical Care Medicine  History & Physical    Patient Name: Rena Quintana  MRN: [id number]  Admission Date: 2/3/2018  Hospital Length of Stay: 0 days  Code Status: Full Code  Attending Physician: Navid Fiore MD   Primary Care Provider: Yaritza Yanes MD   Principal Problem: Aorto-esophageal fistula    Subjective:     HPI:  Ms. Quintana is a 67 y.o F with a history significant for pulmonary HTN on home milrinone, CAD s/p EN, HFpEF, CIARAN, morbid obesity, persist afib s/p PVI ablation on 1/5/18 and then presented on 1/29 with symptoms of fever, also had N/V/D.     In the ED she had two witnessed seizures and was started on keppra. EEG no epileptiform abnormalities captured. MRI was significant for an area of restricted diffusion in her right frontoparietal lobe that was predominantly cortical and spared the white matter, but with T2 changes that appear c/w cytotoxic edema.      She was found to be bacteremic (lactobascillus). There was concern for bacterial meningitis vs. Stroke. Initially the PICC line line was thought to be the source of the infection but an atrial-esophageal fistula was also on the differential. On 1/30 esophagram was performed which did not show extravasation to suggest fistula. Today (2/2/18), she had an LP performed and one hour after suffered a PEA and underwent ACLS protocol with ROCS. 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     Patient was evaluated by GI at [facility], who recommended transferring the patient to [facility]; as they do not have personal to assist with esophageal stenting.              Hospital/ICU Course:  2/3: transferred to [facility]      Past Medical History:   Diagnosis Date    A-fib     CHF (congestive heart failure)     dr. olivares    Coronary artery disease     Diabetes mellitus     Dyslipidemia     Hypertension      Obstructive sleep apnea     Pulmonary hypertension     dr. MARIO    Thyroid disease        Past Surgical History:   Procedure Laterality Date    APPENDECTOMY      ATRIAL ABLATION SURGERY      didn't work for her afib    ELBOW SURGERY      FOOT SURGERY      HYSTERECTOMY      KNEE SURGERY         Review of patient's allergies indicates:   Allergen Reactions    Cardizem [diltiazem hcl] Hives, Shortness Of Breath and Other (See Comments)     Procardia    Procardia [nifedipine]      Low BP       Family History     Problem Relation (Age of Onset)    Heart failure Mother    Hypertension Father    Hypoglycemic Mother    Pacemaker/defibrilator Mother    Prostate cancer Father        Social History Main Topics    Smoking status: Never Smoker    Smokeless tobacco: Not on file    Alcohol use No    Drug use: No    Sexual activity: Yes     Partners: Male      Review of Systems   Unable to perform ROS: Intubated     Objective:     Vital Signs (Most Recent):  Temp: (!) 107.6 °F (42 °C) (02/03/18 0515)  Pulse: (!) 135 (02/03/18 0552)  Resp: 14 (02/03/18 0552)  SpO2: 96 % (02/03/18 0552) Vital Signs (24h Range):  Temp:  [107.6 °F (42 °C)] 107.6 °F (42 °C)  Pulse:  [135-138] 135  Resp:  [14-18] 14  SpO2:  [95 %-96 %] 96 %  Arterial Line BP: (130)/(130) 130/130   Weight: 131.7 kg (290 lb 5.5 oz)  Body mass index is 48.32 kg/m².      Intake/Output Summary (Last 24 hours) at 02/03/18 0621  Last data filed at 02/03/18 0619   Gross per 24 hour   Intake              200 ml   Output                0 ml   Net              200 ml       Physical Exam   Constitutional: She is intubated.   Eyes: Pupils are equal, round, and reactive to light.   Pulmonary/Chest: She is intubated.       Vents:  Vent Mode: A/C (02/03/18 0552)  Ventilator Initiated: Yes (02/03/18 0552)  Set Rate: 12 bmp (02/03/18 0552)  Vt Set: 450 mL (02/03/18 0552)  PEEP/CPAP: 5 cmH20 (02/03/18 0552)  Oxygen Concentration (%): 50 (02/03/18 0552)  Peak Airway  Pressure: 29 cmH2O (02/03/18 0552)  Total Ve: 6.61 mL (02/03/18 0552)  F/VT Ratio<105 (RSBI): (!) 30.7 (02/03/18 0552)  Lines/Drains/Airways     Central Venous Catheter Line                 Port A Cath Single Lumen right subclavian -- days          Drain                 Urethral Catheter 02/02/18 1 day          Airway                 Airway - Non-Surgical 02/02/18 Endotracheal Tube 1 day          Arterial Line                 Arterial Line 02/02/18 Left Radial 1 day          Peripheral Intravenous Line                 Peripheral IV - Single Lumen 02/02/18 Right Upper Arm 1 day         Peripheral IV - Single Lumen 02/03/18 0525 Right Hand less than 1 day              Significant Labs:    CBC/Anemia Profile:    Recent Labs  Lab 02/03/18 0525   WBC 18.41*   HGB 13.5   HCT 41.5      MCV 86   RDW 15.2*        Chemistries:    Recent Labs  Lab 02/03/18  0525   *   K 4.8      CO2 23   BUN 30*   CREATININE 2.4*   CALCIUM 8.8   ALBUMIN 3.1*   PROT 6.9   BILITOT 1.4*   ALKPHOS 99   *   *   MG 1.5*   PHOS 2.3*       ABGs:   Recent Labs  Lab 02/03/18 0552   PH 7.449   PCO2 38.6   HCO3 26.7   POCSATURATED 97   BE 3     Blood Culture: No results for input(s): LABBLOO in the last 48 hours.  CMP:   Recent Labs  Lab 02/03/18  0525   *   K 4.8      CO2 23   *   BUN 30*   CREATININE 2.4*   CALCIUM 8.8   PROT 6.9   ALBUMIN 3.1*   BILITOT 1.4*   ALKPHOS 99   *   *   ANIONGAP 12   EGFRNONAA 20.6*     Coagulation:   Recent Labs  Lab 02/03/18  0525   INR 1.5*     Lactic Acid:   Recent Labs  Lab 02/03/18  0525   LACTATE 1.9     Lipid Panel: No results for input(s): CHOL, HDL, LDLCALC, TRIG, CHOLHDL in the last 48 hours.  Urine Culture: No results for input(s): LABURIN in the last 48 hours.  Urine Studies: No results for input(s): COLORU, APPEARANCEUA, PHUR, SPECGRAV, PROTEINUA, GLUCUA, KETONESU, BILIRUBINUA, OCCULTUA, NITRITE, UROBILINOGEN, LEUKOCYTESUR, RBCUA, WBCUA,  BACTERIA, SQUAMEPITHEL, HYALINECASTS in the last 48 hours.    Invalid input(s): NICA    Significant Imaging: I have reviewed all pertinent imaging results/findings within the past 24 hours.    Assessment/Plan:     Neuro   New onset seizure    EEG no epileptiform abnormalities captured. MRI was significant for an area of restricted diffusion in her right frontoparietal lobe that was predominantly cortical and spared the white matter, but with T2 changes that appear c/w cytotoxic edema.      - continue keppra 500 mg IV BID          Pulmonary   Pulmonary HTN    Hold home milrinone, sildenafil          Cardiac/Vascular   PEA (Pulseless electrical activity)    - cont to monitor  - not currently being cooled        CAD (coronary artery disease)    S/p PCI    - holding ASA  - hold statin         HTN (hypertension)    Given sepsis  - hold valsartan 325, amlodipine 10, spironolactone 100        A-fib    S/p ablation 1/5/18    - continue amiodarone gtt  - holding eliquis possibly procedure today   - 2d echo        CHF (congestive heart failure)    - Repeat Echo  - holding ARB, BB, spironolactone         ID   Bacteremia    Lactobacillus; 2 sets blood cultures positive from [facility]    - repeat blood cultures   - started Vanc and zosyn  - monitor CBC          Endocrine   Hypothyroidism (acquired)    - continue home dose synthroid; switch to IV        DM (diabetes mellitus)    - low dose sliding scale   - accuchecks q6hr         GI   * Aorto-esophageal fistula    (2/2/18) CT PE protocol was performed and a small air bubble was seen posterior to the the left atrium anterior to the esophagus (concerning for atrial-esophageal fistula).       - NPO; possible procedure   - GI advanced endoscopy service consulted  - CTS consulted             Critical Care Daily Checklist:    A: Awake: RASS Goal/Actual Goal:    Actual: Cee Agitation Sedation Scale (RASS): Unarousable   B: Spontaneous Breathing Trial Performed?     C: SAT & SBT  Coordinated?  No                      D: Delirium: CAM-ICU Overall CAM-ICU: Positive   E: Early Mobility Performed? No   F: Feeding Goal:    Status:     Current Diet Order   Procedures    Diet NPO      AS: Analgesia/Sedation    T: Thromboembolic Prophylaxis no   H: HOB > 300 Yes   U: Stress Ulcer Prophylaxis (if needed) yes   G: Glucose Control prn   B: Bowel Function     I: Indwelling Catheter (Lines & Huerta) Necessity Yes     D: De-escalation of Antimicrobials/Pharmacotherapies yes    Plan for the day/ETD GI consult & CTS consult    Code Status:  Family/Goals of Care: Full Code            Critical care was time spent personally by me on the following activities: development of treatment plan with patient or surrogate and bedside caregivers, discussions with consultants, evaluation of patient's response to treatment, examination of patient, ordering and performing treatments and interventions, ordering and review of laboratory studies, ordering and review of radiographic studies, pulse oximetry, re-evaluation of patient's condition. This critical care time did not overlap with that of any other provider or involve time for any procedures.     Calderon Clark MD  Critical Care Medicine  [facility]

## 2018-02-03 NOTE — PROGRESS NOTES
Patient arrived from [facility] intubated w/a 7.0 ett 23cm @lip. Patient was on A/C Rate 14 Vt 450 Peep 5 FiO2 50%. Patient was placed on ventilator at documented settings. Will continue to monitor.

## 2018-02-03 NOTE — HOSPITAL COURSE
Transferred to [facility] MICU on the 02/03, CT scan done. 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 On 02/04 Discussed goal of care reviewed with the family that agreed on comfort care, soon after comfort care started pt passed away, death was announced 16:35.

## 2018-02-03 NOTE — HPI
Patient is intubated so hx is from chart    67 y.o F with a history significant for pulmonary HTN on home milrinone, CAD s/p EN, HFpEF, CIARAN, morbid obesity, persist afib s/p PVI ablation on 1/5/18 and then presented on 1/29 with symptoms of fever, also had N/V/D.      In the ED she had two witnessed seizures and was started on keppra. EEG no epileptiform abnormalities captured. MRI was significant for an area of restricted diffusion in her right frontoparietal lobe that was predominantly cortical and spared the white matter, but with T2 changes that appear c/w cytotoxic edema.       She was found to be bacteremic (lactobacillus in 2/2 sets of blood cxs). There was concern for bacterial meningitis vs stroke. LP was not concerning for bacterial meningitis and HSV pcr was negative. Initially the PICC line line was thought to be the source of the infection but an atrial-esophageal fistula was also on the differential. On 1/30 esophagram was performed which did not show extravasation to suggest fistula. Today (2/2/18), she had an LP performed and one hour after suffered a PEA and underwent ACLS protocol with ROCS. 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      Patient was evaluated by GI at [facility], who recommended transferring the patient to [facility]; as they do not have personal to assist with esophageal stenting.       Patient received vanco, keila, zosyn, and acyclovir at OSH but here has been narrowed to zosyn. Repeat blood cxs have been negative. Line was not removed

## 2018-02-03 NOTE — TREATMENT PLAN
10:56 AM 2/3/2018     Patient examined with Dr Engel and discussed with primary team and consultants: MICU, cardiac surgery, AES. Full consult to follow after adequate review of records and appropriate tests. She appears to be a lady who developed an esphagoatrial fistula after cath lab ablation. Will need to determine if she has devastating neurologic injury, stroke team consulted. If reasonable change of neurologic recovery she could be considered for a palliative endoscopically placed esophageal stent. In the mean time recommended placement on broad spectrum antibiotics covering UGI keiry. Recommend blood cultures. Report is she had normal esophagram, but CT chest with IV contrast showed atrial air. Will need to independently review, so these should be loaded int PACS. If not available for review should be repeated here.     If she sealed the injury as suggested by esophagram then recommend confirmation during esophagoscopy. In discussion with AES if stent is indicated at that point and if would be performed by thoracic surgery or advanced endoscopy. Discussed expected mortality with this difficult problem is near 100%, but no treatment with a persistent fistula will be 100%.     Esau Scott D.O.   Fellow in Cardiothoracic Surgery  [facility]  Pg [phone]  2/3/2018 11:08 AM

## 2018-02-03 NOTE — ASSESSMENT & PLAN NOTE
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       - NPO; possible procedure   - GI advanced endoscopy service consulted  - CTS consulted

## 2018-02-03 NOTE — ASSESSMENT & PLAN NOTE
EEG no epileptiform abnormalities captured. MRI was significant for an area of restricted diffusion in her right frontoparietal lobe that was predominantly cortical and spared the white matter, but with T2 changes that appear c/w cytotoxic edema.      - continue keppra 500 mg IV BID

## 2018-02-04 PROBLEM — G93.1 ANOXIC BRAIN INJURY: Status: ACTIVE | Noted: 2018-01-01

## 2018-02-04 PROBLEM — I63.9 EMBOLIC STROKE: Status: ACTIVE | Noted: 2018-01-01

## 2018-02-04 PROBLEM — R50.9 FEBRILE: Status: ACTIVE | Noted: 2018-01-01

## 2018-02-04 NOTE — ASSESSMENT & PLAN NOTE
Suspected atrial-esophageal fistula:   After extensive review of the records we are unsure if patient truly has an atrial-esophageal fistula. After atrial esophageal fistula first considered 3 days have passed. There has been no hematemesis or bloody gastric contents. No hematochezia or melena. The esophogram which was negative. She has not had evidence of another stroke from air entering the atrium. At this point endoscopy will not be helpful and may be harmful. Management will be non-operative.

## 2018-02-04 NOTE — PROGRESS NOTES
[facility]  Infectious Disease  Progress Note    Patient Name: Rena Quintana  MRN: [id number]  Admission Date: 2/3/2018  Length of Stay: 1 days  Attending Physician: Farzad Moss MD  Primary Care Provider: Yaritza Yanes MD    Isolation Status: No active isolations  Assessment/Plan:      Bacteremia    67 y.o F with a history significant for pulmonary HTN on home milrinone, CAD s/p EN, HFpEF, CIARAN, morbid obesity, persist afib s/p PVI ablation on 1/5/18 who is admitted for atrial-esophageal fistula and lactobacillus bacteremia. Source is either the line or atrial-esophageal fistula    -lactobacillus can be resistant to vanco  -continue zosyn  -follow repeat blood cxs   -surgery is not planned  -duration will be 2 weeks from first negative cx            Thank you for your consult. I will follow-up with patient. Please contact us if you have any additional questions.    Everardo Ham MD  Infectious Disease  [facility]    Subjective:     Principal Problem:Aorto-esophageal fistula    HPI: Patient is intubated so hx is from chart    67 y.o F with a history significant for pulmonary HTN on home milrinone, CAD s/p EN, HFpEF, CIARAN, morbid obesity, persist afib s/p PVI ablation on 1/5/18 and then presented on 1/29 with symptoms of fever, also had N/V/D.      In the ED she had two witnessed seizures and was started on keppra. EEG no epileptiform abnormalities captured. MRI was significant for an area of restricted diffusion in her right frontoparietal lobe that was predominantly cortical and spared the white matter, but with T2 changes that appear c/w cytotoxic edema.       She was found to be bacteremic (lactobacillus in 2/2 sets of blood cxs). There was concern for bacterial meningitis vs stroke. LP was not concerning for bacterial meningitis and HSV pcr was negative. Initially the PICC line line was thought to be the source of the infection but an atrial-esophageal fistula was  also on the differential. On 1/30 esophagram was performed which did not show extravasation to suggest fistula. Today (2/2/18), she had an LP performed and one hour after suffered a PEA and underwent ACLS protocol with ROCS. 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      Patient was evaluated by GI at [facility], who recommended transferring the patient to [facility]; as they do not have personal to assist with esophageal stenting.       Patient received vanco, keila, zosyn, and acyclovir at OSH but here has been narrowed to zosyn. Repeat blood cxs have been negative. Line was not removed   Interval History: No overnight events    Review of Systems   Unable to perform ROS: Intubated     Objective:     Vital Signs (Most Recent):  Temp: (!) 100.4 °F (38 °C) (02/04/18 0720)  Pulse: 103 (02/04/18 1110)  Resp: 12 (02/04/18 1110)  BP: (!) 98/57 (02/04/18 0800)  SpO2: (!) 92 % (02/04/18 1110) Vital Signs (24h Range):  Temp:  [95.8 °F (35.4 °C)-101.4 °F (38.6 °C)] 100.4 °F (38 °C)  Pulse:  [] 103  Resp:  [0-18] 12  SpO2:  [92 %-95 %] 92 %  BP: (98)/(57) 98/57  Arterial Line BP: ()/(49-61) 134/55     Weight: (!) 136.1 kg (300 lb 0.7 oz)  Body mass index is 54.88 kg/m².    Estimated Creatinine Clearance: 19.7 mL/min (based on SCr of 3.8 mg/dL (H)).    Physical Exam   Constitutional: She appears well-developed and well-nourished. She is intubated.   HENT:   Head: Normocephalic and atraumatic.   Eyes: Pupils are equal, round, and reactive to light.   Cardiovascular: Normal rate and normal heart sounds.    Pulmonary/Chest: Breath sounds normal. She is intubated.   Abdominal: Soft.   Neurological:   sedated       Significant Labs: All pertinent labs within the past 24 hours have been reviewed.    Significant Imaging: I have reviewed all pertinent imaging results/findings within the past 24 hours.

## 2018-02-04 NOTE — SUBJECTIVE & OBJECTIVE
No current facility-administered medications on file prior to encounter.      Current Outpatient Prescriptions on File Prior to Encounter   Medication Sig    amiodarone (PACERONE) 200 MG Tab     amlodipine (NORVASC) 10 MG tablet Take 10 mg by mouth once daily.    atorvastatin (LIPITOR) 40 MG tablet Take 40 mg by mouth once daily.    ciprofloxacin HCl (CIPRO) 500 MG tablet Take 1 tablet (500 mg total) by mouth 2 (two) times daily.    ELIQUIS 5 mg Tab 5 mg 2 (two) times daily.     fluticasone (FLONASE) 50 mcg/actuation nasal spray 1 spray by Nasal route as needed.     furosemide (LASIX) 40 MG tablet Take 1 tablet (40 mg total) by mouth once daily. (Patient taking differently: Take 40 mg by mouth as needed. )    glimepiride (AMARYL) 4 MG tablet Take 4 mg by mouth daily with breakfast.     hydrALAZINE (APRESOLINE) 100 MG tablet Take 100 mg by mouth 3 (three) times daily.    JANUVIA 100 mg Tab Take 100 mg by mouth once daily.     levothyroxine (SYNTHROID) 50 MCG tablet Take 50 mcg by mouth once daily.    metformin (GLUCOPHAGE) 1000 MG tablet Take 1,000 mg by mouth 2 (two) times daily with meals.    metoprolol succinate (TOPROL-XL) 100 MG 24 hr tablet Take 100 mg by mouth once daily.     mirabegron 50 mg Tb24 Take 1 tablet (50 mg total) by mouth once daily.    multivitamin (ONE DAILY MULTIVITAMIN) per tablet Take 1 tablet by mouth once daily.    nitrofurantoin, macrocrystal-monohydrate, (MACROBID) 100 MG capsule Take 1 capsule (100 mg total) by mouth 2 (two) times daily.    oxybutynin (DITROPAN XL) 15 MG TR24 Take 5 mg by mouth once daily.    sertraline (ZOLOFT) 50 MG tablet Take 50 mg by mouth once daily.     sildenafil (REVATIO) 20 mg Tab Take 20 mg by mouth 3 (three) times daily. Discontinued 9/13/13--to start Adcirca    spironolactone (ALDACTONE) 100 MG tablet Take 100 mg by mouth once daily.    valsartan (DIOVAN) 320 MG tablet Take 320 mg by mouth once daily.    vitamin D 1000 units Tab Take 185  mg by mouth once daily.       Review of patient's allergies indicates:   Allergen Reactions    Cardizem [diltiazem hcl] Hives, Shortness Of Breath and Other (See Comments)     Procardia    Procardia [nifedipine]      Low BP       Past Medical History:   Diagnosis Date    A-fib     CHF (congestive heart failure)     dr. olivares    Coronary artery disease     Diabetes mellitus     Dyslipidemia     H/O ischemic right MCA stroke 2/3/2018    Hypertension     New onset seizure 2/3/2018    Obstructive sleep apnea     Pulmonary hypertension     dr. MARIO    Thyroid disease      Past Surgical History:   Procedure Laterality Date    APPENDECTOMY      ATRIAL ABLATION SURGERY      didn't work for her afib    ELBOW SURGERY      FOOT SURGERY      HYSTERECTOMY      KNEE SURGERY       Family History     Problem Relation (Age of Onset)    Heart failure Mother    Hypertension Father    Hypoglycemic Mother    Pacemaker/defibrilator Mother    Prostate cancer Father        Social History Main Topics    Smoking status: Never Smoker    Smokeless tobacco: Not on file    Alcohol use No    Drug use: No    Sexual activity: Yes     Partners: Male     Review of Systems   Unable to perform ROS: Intubated     Objective:     Vital Signs (Most Recent):  Temp: 99.6 °F (37.6 °C) (02/03/18 1901)  Pulse: 88 (02/03/18 2103)  Resp: 13 (02/03/18 2103)  SpO2: (!) 93 % (02/03/18 2103) Vital Signs (24h Range):  Temp:  [95.8 °F (35.4 °C)-107.6 °F (42 °C)] 99.6 °F (37.6 °C)  Pulse:  [] 88  Resp:  [2-30] 13  SpO2:  [92 %-96 %] 93 %  Arterial Line BP: ()/() 128/53     Weight: 131.7 kg (290 lb 5.5 oz)  Body mass index is 48.32 kg/m².  ECOG Performance Status Grade: 4 - Completely disabled    Intake/Output - Last 3 Shifts       02/02 0700 - 02/03 0659 02/03 0700 - 02/04 0659    I.V. (mL/kg)  658.2 (5)    IV Piggyback 200 700    Total Intake(mL/kg) 200 (1.5) 1358.2 (10.3)    Urine (mL/kg/hr) 125 365 (0.2)    Total Output  125 365    Net +75 +993.2                SpO2: (!) 93 %  O2 Device (Oxygen Therapy): ventilator    Physical Exam   Constitutional: She appears ill. She is sedated, intubated and restrained.   HENT:   Head: Normocephalic.   Right Ear: External ear normal.   Left Ear: External ear normal.   Nose: Nose normal.   Mouth/Throat: Oropharynx is clear and moist.   Neck: No JVD present. No tracheal deviation present.   Cardiovascular: Intact distal pulses.    Pulmonary/Chest: No stridor. She is intubated. She has no wheezes. She has no rales. She exhibits no tenderness.   Abdominal: Soft.   Neurological: She is unresponsive.   Skin: Skin is warm. Capillary refill takes 2 to 3 seconds.   Nursing note and vitals reviewed.      Significant Labs:  ABGs:   Recent Labs  Lab 02/03/18  0552   PH 7.449   PCO2 38.6   PO2 87   HCO3 26.7   POCSATURATED 97   BE 3     BMP:   Recent Labs  Lab 02/03/18  0525   *   *   K 4.8      CO2 23   BUN 30*   CREATININE 2.4*   CALCIUM 8.8   MG 1.5*     Cardiac markers: No results for input(s): CKMB, CPKMB, TROPONINT, TROPONINI, MYOGLOBIN in the last 48 hours.  CBC:   Recent Labs  Lab 02/03/18  0525   WBC 18.41*   RBC 4.81   HGB 13.5   HCT 41.5      MCV 86   MCH 28.1   MCHC 32.5     Lactic Acid:   Recent Labs  Lab 02/03/18  0525   LACTATE 1.9     LFTs:   Recent Labs  Lab 02/03/18  0525   *   *   ALKPHOS 99   BILITOT 1.4*   PROT 6.9   ALBUMIN 3.1*     All significant labs over 24 hours reviewed    Significant Diagnostics:  CT: I have reviewed all pertinent results/findings within the past 24 hours  CXR: I have reviewed all pertinent results/findings within the past 24 hours  Echo: I have reviewed all pertinent results/findings within the past 24 hours  EKG: I have reviewed all pertinent results/findings within the past 24 hours  I have reviewed and interpreted all pertinent imaging results/findings within the past 24 hours.     CTA from 1/30 was available to review  and interpret. There is some air between the aorta and the inferior pulmonary vein in the lung parenchyma, and this continues along posterior to the lower lobe bronchus and ongoing pulmonary artery. No contrast extravasated into the esophagus. No air in the middle mediastinum.     VTE Risk Mitigation         Ordered     Medium Risk of VTE  Once      02/03/18 0443     Place sequential compression device  Until discontinued      02/03/18 0443

## 2018-02-04 NOTE — CARE UPDATE
ICU team discussed pt's poor prognosis with the family  Per family's wishes, pt initiated on comfort care    Paige Rubio MD  Internal Medicine PGY-3  Pager [phone]

## 2018-02-04 NOTE — PROGRESS NOTES
[facility]  Gastroenterology  Progress Note    Patient Name: Rena Quintana  MRN: [id number]  Admission Date: 2/3/2018  Hospital Length of Stay: 1 days  Code Status: Full Code   Attending Provider: Farzad Moss MD  Consulting Provider: Brinda Kirk MD  Primary Care Physician: Yaritza Yanes MD  Principal Problem: Aorto-esophageal fistula      Subjective:     Interval History:   Patient seen by Neurology who comment that patient has a poor chance to have a significant/meaningful neurological recovery.    She also underwent CT Chest yesterday which commented on a:  Small linear opacity extending from the posterior wall the left atrium to the esophagus    Review of Systems   Unable to perform ROS: Intubated     Objective:     Vital Signs (Most Recent):  Temp: (!) 100.4 °F (38 °C) (02/04/18 0720)  Pulse: 104 (02/04/18 0900)  Resp: 12 (02/04/18 0900)  BP: (!) 98/57 (02/04/18 0800)  SpO2: (!) 92 % (02/04/18 0900) Vital Signs (24h Range):  Temp:  [95.8 °F (35.4 °C)-101.7 °F (38.7 °C)] 100.4 °F (38 °C)  Pulse:  [] 104  Resp:  [0-18] 12  SpO2:  [92 %-96 %] 92 %  BP: (98)/(57) 98/57  Arterial Line BP: ()/(49-61) 134/55     Weight: (!) 136.1 kg (300 lb 0.7 oz) (02/04/18 0800)  Body mass index is 54.88 kg/m².      Intake/Output Summary (Last 24 hours) at 02/04/18 0933  Last data filed at 02/04/18 0800   Gross per 24 hour   Intake           1678.3 ml   Output              445 ml   Net           1233.3 ml       Lines/Drains/Airways     Central Venous Catheter Line                 Port A Cath Single Lumen right subclavian -- days          Drain                 Urethral Catheter 02/03/18 0823 1 day          Airway                 Airway - Non-Surgical 02/02/18 Endotracheal Tube 2 days          Arterial Line                 Arterial Line 02/02/18 Left Radial 2 days          Peripheral Intravenous Line                 Peripheral IV - Single Lumen 02/02/18 Right Upper Arm 2 days         Peripheral  IV - Single Lumen 02/03/18 0525 Right Hand 1 day                Physical Exam   Constitutional: No distress.   HENT:   Head: Normocephalic and atraumatic.   Eyes: No scleral icterus.   Neck: Normal range of motion.   Cardiovascular: Normal rate and regular rhythm.    Pulmonary/Chest:   Right chest catheter, Decreased breath sounds bilaterally (likely from body habitus and position)     Abdominal: Soft. Bowel sounds are normal. She exhibits no distension. There is no tenderness.   Musculoskeletal: She exhibits edema.   Neurological:   Intubated, not sedated   Skin: She is not diaphoretic.       Significant Labs:  CBC:   Recent Labs  Lab 02/03/18 0525 02/04/18 0615   WBC 18.41* 20.00*   HGB 13.5 13.2   HCT 41.5 40.9    101*     CMP:   Recent Labs  Lab 02/04/18 0615   *   CALCIUM 8.4*   ALBUMIN 2.8*   PROT 6.4   *   K 4.4   CO2 20*   CL 97   BUN 50*   CREATININE 3.8*   ALKPHOS 87   *   *   BILITOT 1.9*     Coagulation:   Recent Labs  Lab 02/04/18 0615   INR 1.4*         Significant Imaging:  Imaging results within the past 24 hours have been reviewed.    Assessment/Plan:     * Suspected aorto-esophageal fistula    67 year old female with a history of HTN, HLD, CIARAN, CAD s/p EN, CHF on home milrinone as well as Pulmonary hypertension on who [facility] is being consulted for esophageal stent placement in the setting of a suspected atrial-esophageal fistula.      After extensive review of the records we are unsure if patient truly has an aorto-esophageal fistula. During the first couple of days she was admitted at [facility] the possibly of a fistula was considered. She had an CT which revealed a small outpouching of the left atrium and was followed by an esophogram which was negative. Following these studies she underwent an LP with a PEA ~ 1 hour later. Therefore at that time a CT chest was repeated which showed a focus of air is noted within the posterior wall of the left atrium. It was  thought that this was the cause of her code and after discussing case with GI at OSH patient was [place] [facility]    [place] [facility] [place] was not only seen by us but also by CTS. She ultimately underwent a CTA yesterday which commented on small linear opacity extending from the posterior wall the left atrium to the esophagus. At this point if there is indeed an atrial-esophageal fistula it is non-functioning (no signs of overt GI bleed) and therefore insufflation during endoscopic intervention could worsen her case.    Recommendations:  --No endoscopic intervention at this point, but will defer final decision to CTS            Thank you for your consult. I will follow-up with patient. Please contact us if you have any additional questions.    Brinda Kirk M.D.  Gastroenterology Fellow, PGY-IV  Pager: [phone]  [facility]

## 2018-02-04 NOTE — ASSESSMENT & PLAN NOTE
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      After extensive review of the records we are unsure if patient truly has an aorto-esophageal fistula. During the first couple of days she was admitted at [facility] the possibly of a fistula was considered. She had an CT which revealed a small outpouching of the left atrium and was followed by an esophogram which was negative. Following these studies she underwent an LP with a PEA ~ 1 hour later. Therefore at that time a CT chest was repeated which showed a focus of air is noted within the posterior wall of the left atrium. It was thought that this was the cause of her code and after discussing case with GI at OSH patient was transferred her to [facility] [place].    Upon arrival to [facility] she was not only seen by us but also by CTS. She ultimately underwent a CTA yesterday which commented on small linear opacity extending from the posterior wall the left atrium to the esophagus. At this point if there is indeed an atrial-esophageal fistula it is non-functioning (no signs of overt GI bleed) and therefore insufflation during endoscopic intervention could worsen her case.    Recommendations:  --No endoscopic intervention at this point, but will defer final decision to CTS

## 2018-02-04 NOTE — SUBJECTIVE & OBJECTIVE
Subjective:     Interval History:   Patient seen by Neurology who comment that patient has a poor chance to have a significant/meaningful neurological recovery.    She also underwent CT Chest yesterday which commented on a:  Small linear opacity extending from the posterior wall the left atrium to the esophagus    Review of Systems   Unable to perform ROS: Intubated     Objective:     Vital Signs (Most Recent):  Temp: (!) 100.4 °F (38 °C) (02/04/18 0720)  Pulse: 104 (02/04/18 0900)  Resp: 12 (02/04/18 0900)  BP: (!) 98/57 (02/04/18 0800)  SpO2: (!) 92 % (02/04/18 0900) Vital Signs (24h Range):  Temp:  [95.8 °F (35.4 °C)-101.7 °F (38.7 °C)] 100.4 °F (38 °C)  Pulse:  [] 104  Resp:  [0-18] 12  SpO2:  [92 %-96 %] 92 %  BP: (98)/(57) 98/57  Arterial Line BP: ()/(49-61) 134/55     Weight: (!) 136.1 kg (300 lb 0.7 oz) (02/04/18 0800)  Body mass index is 54.88 kg/m².      Intake/Output Summary (Last 24 hours) at 02/04/18 0933  Last data filed at 02/04/18 0800   Gross per 24 hour   Intake           1678.3 ml   Output              445 ml   Net           1233.3 ml       Lines/Drains/Airways     Central Venous Catheter Line                 Port A Cath Single Lumen right subclavian -- days          Drain                 Urethral Catheter 02/03/18 0823 1 day          Airway                 Airway - Non-Surgical 02/02/18 Endotracheal Tube 2 days          Arterial Line                 Arterial Line 02/02/18 Left Radial 2 days          Peripheral Intravenous Line                 Peripheral IV - Single Lumen 02/02/18 Right Upper Arm 2 days         Peripheral IV - Single Lumen 02/03/18 0525 Right Hand 1 day                Physical Exam   Constitutional: No distress.   HENT:   Head: Normocephalic and atraumatic.   Eyes: No scleral icterus.   Neck: Normal range of motion.   Cardiovascular: Normal rate and regular rhythm.    Pulmonary/Chest:   Right chest catheter, Decreased breath sounds bilaterally (likely from body  habitus and position)     Abdominal: Soft. Bowel sounds are normal. She exhibits no distension. There is no tenderness.   Musculoskeletal: She exhibits edema.   Neurological:   Intubated, not sedated   Skin: She is not diaphoretic.       Significant Labs:  CBC:   Recent Labs  Lab 02/03/18  0525 02/04/18  0615   WBC 18.41* 20.00*   HGB 13.5 13.2   HCT 41.5 40.9    101*     CMP:   Recent Labs  Lab 02/04/18 0615   *   CALCIUM 8.4*   ALBUMIN 2.8*   PROT 6.4   *   K 4.4   CO2 20*   CL 97   BUN 50*   CREATININE 3.8*   ALKPHOS 87   *   *   BILITOT 1.9*     Coagulation:   Recent Labs  Lab 02/04/18 0615   INR 1.4*         Significant Imaging:  Imaging results within the past 24 hours have been reviewed.

## 2018-02-04 NOTE — ASSESSMENT & PLAN NOTE
- 67 y.o F with medical history significant for CIARAN, morbid obesity / DM / pulmonary HTN / CAD s/p EN, HFpEF, persist afib s/p PVI ablation on 1/5/18   presenting as transfer from [facility] for CTS / GI eval for ? atrial-esophageal fistula. General neurology consulted for further prognostication for ongoing plans for active intervention.    - 1:30 Active concerns for sz (2 episode of GTC, with Todds paralysis) in the setting of lactobacillus bacteremia / atrial-esophageal fistula / Klebsiella UC + MRI findings acute right fronto parietal ribboning with sub-acute to remote parieto occipital stroke + negative EEG for interictal discharges + LP with bacterial meningitis pic WBC / with PMN predominance / with elevated glucose (95) and protein (145)  - 2/2/18: PEA and underwent ACLS protocol with ROCS in 10 mins. Was intubated and maintained on pressor support and amio infusion. CT PE protocol NEGATIVE / No follow-up MRI or EEG performed    - Evaluation at [facility]:   --- Poor brainstem reflexes (absent corneal / oculocephalic / gag / cough / + pupils / + breaths above vent) / comatose not on sedation with poor response to pain with only LLE flexion withdrawal / with persisting pressor support and amio infusion for perfusion maintenance /     Recommendations:  - Non-co relating neuro examination to prior imagings concerning for anoxic brain injury >> NCSE. Background of Afib on AC hold raises concerns for embolic etiologies. Prognosis from neuro standpoint is severe/ poor at this standpoint, however need further investigations to prognosticate  --- MRI brain wo contrast stat  --- continuous video EEG monitoring  - Continue keppra 500 mg BID dosing / poor renal clearance, hence low dosage appropriate  - Poor response to pain with only LLE flexion withdrawal   - continue meningitis coverage / zoysn & vancomycin   - correct lytes as needed / adequate management for bacteremia, sepsis / avoid hypoxia or hypercapnea

## 2018-02-04 NOTE — SUBJECTIVE & OBJECTIVE
Interval History/Significant Events:   NAEON. CT scan done. Small linear opacity extending from the posterior wall the left atrium to the esophagus, given provided history, likely fistula tract from left atrium to esophagus. Per review of recent consult notes; no intervention.     Review of Systems   Unable to perform ROS: Acuity of condition     Objective:     Vital Signs (Most Recent):  Temp: (!) 100.4 °F (38 °C) (02/04/18 0720)  Pulse: 104 (02/04/18 0800)  Resp: 15 (02/04/18 0800)  BP: (!) 98/57 (02/04/18 0800)  SpO2: (!) 94 % (02/04/18 0800) Vital Signs (24h Range):  Temp:  [95.8 °F (35.4 °C)-103.6 °F (39.8 °C)] 100.4 °F (38 °C)  Pulse:  [] 104  Resp:  [2-30] 15  SpO2:  [92 %-96 %] 94 %  BP: (98)/(57) 98/57  Arterial Line BP: ()/(49-64) 111/51   Weight: (!) 136.1 kg (300 lb 0.7 oz)  Body mass index is 54.88 kg/m².      Intake/Output Summary (Last 24 hours) at 02/04/18 0834  Last data filed at 02/04/18 0800   Gross per 24 hour   Intake          2190.24 ml   Output              455 ml   Net          1735.24 ml       Physical Exam   Constitutional: She appears ill. She is sedated, intubated and restrained.   HENT:   Head: Normocephalic.   Right Ear: External ear normal.   Left Ear: External ear normal.   Nose: Nose normal.   Mouth/Throat: Oropharynx is clear and moist.   Eyes: Pupils are equal, round, and reactive to light.   Neck: No JVD present. No tracheal deviation present.   Cardiovascular: Intact distal pulses.    Pulmonary/Chest: No stridor. She is intubated. She has no wheezes. She has no rales. She exhibits no tenderness.   Abdominal: Soft.   Neurological: She is unresponsive.   Skin: Skin is warm. Capillary refill takes 2 to 3 seconds.   Nursing note and vitals reviewed.      Vents:  Vent Mode: A/C (02/04/18 0732)  Ventilator Initiated: Yes (02/03/18 0552)  Set Rate: 12 bmp (02/04/18 0732)  Vt Set: 450 mL (02/04/18 0732)  PEEP/CPAP: 5 cmH20 (02/04/18 0732)  Oxygen Concentration (%): 50  (02/04/18 0800)  Peak Airway Pressure: 35 cmH2O (02/04/18 0732)  Plateau Pressure: 26 cmH20 (02/04/18 0732)  Total Ve: 7.79 mL (02/04/18 0732)  F/VT Ratio<105 (RSBI): (!) 33.97 (02/04/18 0732)  Lines/Drains/Airways     Central Venous Catheter Line                 Port A Cath Single Lumen right subclavian -- days          Drain                 Urethral Catheter 02/03/18 0823 1 day          Airway                 Airway - Non-Surgical 02/02/18 Endotracheal Tube 2 days          Arterial Line                 Arterial Line 02/02/18 Left Radial 2 days          Peripheral Intravenous Line                 Peripheral IV - Single Lumen 02/02/18 Right Upper Arm 2 days         Peripheral IV - Single Lumen 02/03/18 0525 Right Hand 1 day              Significant Labs:    CBC/Anemia Profile:    Recent Labs  Lab 02/03/18 0525 02/04/18 0615   WBC 18.41* 20.00*   HGB 13.5 13.2   HCT 41.5 40.9    101*   MCV 86 86   RDW 15.2* 16.1*        Chemistries:    Recent Labs  Lab 02/03/18 0525 02/04/18 0615   * 132*   K 4.8 4.4    97   CO2 23 20*   BUN 30* 50*   CREATININE 2.4* 3.8*   CALCIUM 8.8 8.4*   ALBUMIN 3.1* 2.8*   PROT 6.9 6.4   BILITOT 1.4* 1.9*   ALKPHOS 99 87   * 958*   * 815*   MG 1.5* 2.2   PHOS 2.3* 7.0*       Bilirubin:   Recent Labs  Lab 02/03/18 0525 02/04/18 0615   BILITOT 1.4* 1.9*     Blood Culture:   Recent Labs  Lab 02/03/18 0523 02/03/18  0533   LABBLOO No Growth to date No Growth to date     CMP:   Recent Labs  Lab 02/03/18 0525 02/04/18 0615   * 132*   K 4.8 4.4    97   CO2 23 20*   * 226*   BUN 30* 50*   CREATININE 2.4* 3.8*   CALCIUM 8.8 8.4*   PROT 6.9 6.4   ALBUMIN 3.1* 2.8*   BILITOT 1.4* 1.9*   ALKPHOS 99 87   * 815*   * 958*   ANIONGAP 12 15   EGFRNONAA 20.6* 11.8*     Coagulation:   Recent Labs  Lab 02/04/18  0615   INR 1.4*     Lactic Acid:   Recent Labs  Lab 02/03/18  0525   LACTATE 1.9     All pertinent labs within the past 24 hours  have been reviewed.    Significant Imaging:  CT: I have reviewed all pertinent results/findings within the past 24 hours and my personal findings are:  CT scan done. Small linear opacity extending from the posterior wall the left atrium to the esophagus, given provided history, likely fistula tract from left atrium to esophagus.

## 2018-02-04 NOTE — ASSESSMENT & PLAN NOTE
67 y.o F with a history significant for pulmonary HTN on home milrinone, CAD s/p EN, HFpEF, CIARAN, morbid obesity, persist afib s/p PVI ablation on 1/5/18 who is admitted for atrial-esophageal fistula and lactobacillus bacteremia. Source is either the line or atrial-esophageal fistula    -lactobacillus can be resistant to vanco  -continue zosyn  -follow repeat blood cxs   -surgery is not planned  -duration will be 2 weeks from first negative cx

## 2018-02-04 NOTE — CONSULTS
[facility]  Neurology  Consult Note    Patient Name: Rena Quintana  MRN: [id number]  Admission Date: 2/3/2018  Hospital Length of Stay: 0 days  Code Status: Full Code   Attending Provider: Farzad Moss MD   Consulting Provider: Kareem Retana MD  Primary Care Physician: Yaritza Yanes MD  Principal Problem:Aorto-esophageal fistula    Inpatient consult to Neurology  Consult performed by: KAREEM RETANA  Consult ordered by: MARYANNE LOMELI         Subjective:     Chief Complaint:  Seizure / CA     HPI:   Rena Metcalf is a 67 y.o F with medical history significant for CIARAN, morbid obesity / DM / pulmonary HTN on home milrinone, CAD s/p EN, HFpEF, persist afib s/p PVI ablation on 1/5/18   presenting as transfer from [facility] for CTS / GI eval for ? atrial-esophageal fistula. Active concerns for sz / cortical ribboning / CA prior to transfer, hence neurology consulted for further prognostication for ongoing plans for active intervention.    Patient was admitted to [facility] on 1/29 for 1 day history of fever, malaise, N/V/D, and substernal chest pain. In the ED was found to be in Afib with RVR and had 2 episode of GTC, with Todds paralysis. Urgent neuro eval revealed right fronto parietal ribboning concerning for sz induced changes vs meningitis. Imaging also revealed prior sub-acute to chronic changes of right parieto-occipital stroke. EEG done was negative for interictal discharges. LP done revealed elevated WBC / with PMN predominance and was maintained on meningitis coverage and sz prophylaxis. CT Angio Chest w/wo contrast 1/30 revealed findings concerning for atrial esophageal fistula with BC positive for lactobacillus. On 2/2/18 patient had PEA and underwent ACLS protocol with ROCS in 10 mins. Was intubated and maintained on pressor support and amio infusion. CT PE protocol was performed revealing small air bubble  posterior to the the left atrium anterior to the esophagus  concerning for atrial-esophageal fistula and was transferred for further CTS / GI eval.           Past Medical History:   Diagnosis Date    A-fib     CHF (congestive heart failure)     dr. olivares    Coronary artery disease     Diabetes mellitus     Dyslipidemia     Hypertension     New onset seizure 2/3/2018    Obstructive sleep apnea     Pulmonary hypertension     dr. MARIO    Thyroid disease        Past Surgical History:   Procedure Laterality Date    APPENDECTOMY      ATRIAL ABLATION SURGERY      didn't work for her afib    ELBOW SURGERY      FOOT SURGERY      HYSTERECTOMY      KNEE SURGERY         Review of patient's allergies indicates:   Allergen Reactions    Cardizem [diltiazem hcl] Hives, Shortness Of Breath and Other (See Comments)     Procardia    Procardia [nifedipine]      Low BP       Current Neurological Medications:     No current facility-administered medications on file prior to encounter.      Current Outpatient Prescriptions on File Prior to Encounter   Medication Sig    amiodarone (PACERONE) 200 MG Tab     amlodipine (NORVASC) 10 MG tablet Take 10 mg by mouth once daily.    atorvastatin (LIPITOR) 40 MG tablet Take 40 mg by mouth once daily.    ciprofloxacin HCl (CIPRO) 500 MG tablet Take 1 tablet (500 mg total) by mouth 2 (two) times daily.    ELIQUIS 5 mg Tab 5 mg 2 (two) times daily.     fluticasone (FLONASE) 50 mcg/actuation nasal spray 1 spray by Nasal route as needed.     furosemide (LASIX) 40 MG tablet Take 1 tablet (40 mg total) by mouth once daily. (Patient taking differently: Take 40 mg by mouth as needed. )    glimepiride (AMARYL) 4 MG tablet Take 4 mg by mouth daily with breakfast.     hydrALAZINE (APRESOLINE) 100 MG tablet Take 100 mg by mouth 3 (three) times daily.    JANUVIA 100 mg Tab Take 100 mg by mouth once daily.     levothyroxine (SYNTHROID) 50 MCG tablet Take 50 mcg by mouth once daily.    metformin (GLUCOPHAGE) 1000 MG tablet Take 1,000 mg  by mouth 2 (two) times daily with meals.    metoprolol succinate (TOPROL-XL) 100 MG 24 hr tablet Take 100 mg by mouth once daily.     mirabegron 50 mg Tb24 Take 1 tablet (50 mg total) by mouth once daily.    multivitamin (ONE DAILY MULTIVITAMIN) per tablet Take 1 tablet by mouth once daily.    nitrofurantoin, macrocrystal-monohydrate, (MACROBID) 100 MG capsule Take 1 capsule (100 mg total) by mouth 2 (two) times daily.    oxybutynin (DITROPAN XL) 15 MG TR24 Take 5 mg by mouth once daily.    sertraline (ZOLOFT) 50 MG tablet Take 50 mg by mouth once daily.     sildenafil (REVATIO) 20 mg Tab Take 20 mg by mouth 3 (three) times daily. Discontinued 9/13/13--to start Adcirca    spironolactone (ALDACTONE) 100 MG tablet Take 100 mg by mouth once daily.    valsartan (DIOVAN) 320 MG tablet Take 320 mg by mouth once daily.    vitamin D 1000 units Tab Take 185 mg by mouth once daily.     Family History     Problem Relation (Age of Onset)    Heart failure Mother    Hypertension Father    Hypoglycemic Mother    Pacemaker/defibrilator Mother    Prostate cancer Father        Social History Main Topics    Smoking status: Never Smoker    Smokeless tobacco: Not on file    Alcohol use No    Drug use: No    Sexual activity: Yes     Partners: Male     Review of Systems   Unable to perform ROS: Intubated   Constitutional: Positive for fever (Abnormal thermoregulation (Fluctuating hypothermia / hyperthemria) ).   Neurological: Positive for weakness. Negative for seizures and facial asymmetry.     Objective:     Vital Signs (Most Recent):  Temp: 99.6 °F (37.6 °C) (02/03/18 1901)  Pulse: 80 (02/03/18 1906)  Resp: 18 (02/03/18 1906)  SpO2: (!) 93 % (02/03/18 1906) Vital Signs (24h Range):  Temp:  [95.8 °F (35.4 °C)-107.6 °F (42 °C)] 99.6 °F (37.6 °C)  Pulse:  [] 80  Resp:  [2-30] 18  SpO2:  [92 %-96 %] 93 %  Arterial Line BP: ()/() 115/52     Weight: 131.7 kg (290 lb 5.5 oz)  Body mass index is 48.32  kg/m².    Physical Exam   HENT:   Head: Normocephalic.   Intubated    Eyes: Pupils are equal, round, and reactive to light.   Sluggish / small    Neck: Neck supple.   Cardiovascular:   Afib + / On pressor support / amio infusion    Pulmonary/Chest: She has wheezes.   Ventilated / On AC mode / + breaths above vent   Abdominal: Soft.   Neurological: Finger-nose-finger test: Unable to test / Comatose.   Reflex Scores:       Tricep reflexes are 1+ on the right side and 1+ on the left side.       Bicep reflexes are 1+ on the right side and 1+ on the left side.       Brachioradialis reflexes are 1+ on the right side and 1+ on the left side.       Patellar reflexes are 1+ on the right side and 1+ on the left side.      NEUROLOGICAL EXAMINATION:     MENTAL STATUS   Disoriented to person: Not on sedation / Comatose    Level of consciousness: Not on sedation / Comatose     CRANIAL NERVES     CN II   Visual acuity: (inability to test)    CN III, IV, VI   Pupils are equal, round, and reactive to light.  Pupils: (Sluggish reactivity)  Right pupil: Size: 2 mm.   Left pupil: Size: 2 mm.   Ophthalmoparesis: Abnormal oculocephalic reflex.    CN V   Right corneal reflex: abnormal  Left corneal reflex: abnormal  Jaw jerk: abnormal    CN VII   Facial expression full, symmetric.     CN IX, X   Right gag reflex: abnormal  Left gag reflex: abnormal    CN XII   Tongue: not atrophic  Tongue deviation: none    MOTOR EXAM   Muscle bulk: normal  Overall muscle tone: normal       LLE: Flexion withdrawal to pain   None in other extremities     REFLEXES     Reflexes   Right brachioradialis: 1+  Left brachioradialis: 1+  Right biceps: 1+  Left biceps: 1+  Right triceps: 1+  Left triceps: 1+  Right patellar: 1+  Left patellar: 1+  Right plantar: equivocal  Left plantar: equivocal    SENSORY EXAM        LLE: Flexion withdrawal to pain   None in other extremities     GAIT AND COORDINATION     Gait  Gait: (Unable to test / Comatose)     Coordination    Finger-nose-finger test: Unable to test / Comatose.    Tremor   Resting tremor: absent      Significant Labs:   Hemoglobin A1c:   Recent Labs  Lab 02/03/18  0525   HGBA1C 7.7*     Blood Culture:   Recent Labs  Lab 02/03/18  0523 02/03/18  0533   LABBLOO No Growth to date No Growth to date     CBC:   Recent Labs  Lab 02/03/18  0525   WBC 18.41*   HGB 13.5   HCT 41.5        CMP:   Recent Labs  Lab 02/03/18  0525   *   *   K 4.8      CO2 23   BUN 30*   CREATININE 2.4*   CALCIUM 8.8   MG 1.5*   PROT 6.9   ALBUMIN 3.1*   BILITOT 1.4*   ALKPHOS 99   *   *   ANIONGAP 12   EGFRNONAA 20.6*     CSF Studies: No results for input(s): ALIQUT, APPEARCSF, COLORCSF, CSFWBC, CSFRBC, GLUCCSF, LDHCSF, PROTEINCSF, VDRLCSF in the last 48 hours.  Inflammatory Markers: No results for input(s): SEDRATE, CRP, PROCAL in the last 48 hours.  POCT Glucose:   Recent Labs  Lab 02/03/18  1241 02/03/18  1750   POCTGLUCOSE 141* 120*     Urine Studies:   Recent Labs  Lab 02/03/18  0525   COLORU Sarah   APPEARANCEUA Cloudy*   PHUR 5.0   SPECGRAV >=1.030*   PROTEINUA 3+*   GLUCUA 1+*   KETONESU Negative   BILIRUBINUA Negative   OCCULTUA 3+*   NITRITE Negative   UROBILINOGEN Negative   LEUKOCYTESUR Negative   RBCUA 2   WBCUA 4   BACTERIA Few*   SQUAMEPITHEL 1   HYALINECASTS 0     All pertinent lab results from the past 24 hours have been reviewed.      Significant Imaging: I have reviewed and interpreted all pertinent imaging results/findings within the past 24 hours.    Assessment and Plan:     New onset seizure    - 67 y.o F with medical history significant for CIARAN, morbid obesity / DM / pulmonary HTN / CAD s/p EN, HFpEF, persist afib s/p PVI ablation on 1/5/18   presenting as transfer from [facility] for CTS / GI eval for ? atrial-esophageal fistula. General neurology consulted for further prognostication for ongoing plans for active intervention.    - 1:30 Active concerns for sz (2 episode of GTC, with Todds  paralysis) in the setting of lactobacillus bacteremia / atrial-esophageal fistula / Klebsiella UC + MRI findings acute right fronto parietal ribboning with sub-acute to remote parieto occipital stroke + negative EEG for interictal discharges + LP with bacterial meningitis pic WBC / with PMN predominance / with elevated glucose (95) and protein (145)  - 2/2/18: PEA and underwent ACLS protocol with ROCS in 10 mins. Was intubated and maintained on pressor support and amio infusion. CT PE protocol NEGATIVE / No follow-up MRI or EEG performed    - Evaluation at [facility]:   --- Poor brainstem reflexes (absent corneal / oculocephalic / gag / cough / + pupils / + breaths above vent) / comatose not on sedation with poor response to pain with only LLE flexion withdrawal / with persisting pressor support and amio infusion for perfusion maintenance /     Recommendations:  - Non-co relating neuro examination to prior imagings concerning for anoxic brain injury >> NCSE. Background of Afib on AC hold raises concerns for embolic etiologies. Prognosis from neuro standpoint is severe/ poor at this standpoint, however need further investigations to prognosticate  --- MRI brain wo contrast stat  --- continuous video EEG monitoring  - Continue keppra 500 mg BID dosing / poor renal clearance, hence low dosage appropriate  - Poor response to pain with only LLE flexion withdrawal   - continue meningitis coverage / zoysn & vancomycin   - correct lytes as needed / adequate management for bacteremia, sepsis / avoid hypoxia or hypercapnea               H/O ischemic right MCA stroke    - see section above        PEA (Pulseless electrical activity)    - see section above        Bacteremia    - as per primary         Hypothyroidism (acquired)    - continue supplementation        DM (diabetes mellitus)    - strictly avoid hypoglycemia / hyperglycemia (detrimental to anoxic BI)        HTN (hypertension)    - target BP< 160/90 mmHg  - strictly avoid  hypotensive episodes         Pulmonary HTN    - as per primary        A-fib    - rate and rhythm control as per primary  - significant stroke risk factor / not on active AC for ? Interventional procedure  - risks vs benefits weighed / hold AC for now        CHF (congestive heart failure)    - as per primary   - maintain cerebral perfusion             VTE Risk Mitigation         Ordered     Medium Risk of VTE  Once      02/03/18 0443     Place sequential compression device  Until discontinued      02/03/18 0443          Thank you for your consult. I will follow-up with patient. Please contact us if you have any additional questions.    Kareem Retana MD  Neurology  [facility]

## 2018-02-04 NOTE — SUBJECTIVE & OBJECTIVE
Past Medical History:   Diagnosis Date    A-fib     CHF (congestive heart failure)     dr. olivares    Coronary artery disease     Diabetes mellitus     Dyslipidemia     Hypertension     New onset seizure 2/3/2018    Obstructive sleep apnea     Pulmonary hypertension     dr. MARIO    Thyroid disease        Past Surgical History:   Procedure Laterality Date    APPENDECTOMY      ATRIAL ABLATION SURGERY      didn't work for her afib    ELBOW SURGERY      FOOT SURGERY      HYSTERECTOMY      KNEE SURGERY         Review of patient's allergies indicates:   Allergen Reactions    Cardizem [diltiazem hcl] Hives, Shortness Of Breath and Other (See Comments)     Procardia    Procardia [nifedipine]      Low BP       Current Neurological Medications:     No current facility-administered medications on file prior to encounter.      Current Outpatient Prescriptions on File Prior to Encounter   Medication Sig    amiodarone (PACERONE) 200 MG Tab     amlodipine (NORVASC) 10 MG tablet Take 10 mg by mouth once daily.    atorvastatin (LIPITOR) 40 MG tablet Take 40 mg by mouth once daily.    ciprofloxacin HCl (CIPRO) 500 MG tablet Take 1 tablet (500 mg total) by mouth 2 (two) times daily.    ELIQUIS 5 mg Tab 5 mg 2 (two) times daily.     fluticasone (FLONASE) 50 mcg/actuation nasal spray 1 spray by Nasal route as needed.     furosemide (LASIX) 40 MG tablet Take 1 tablet (40 mg total) by mouth once daily. (Patient taking differently: Take 40 mg by mouth as needed. )    glimepiride (AMARYL) 4 MG tablet Take 4 mg by mouth daily with breakfast.     hydrALAZINE (APRESOLINE) 100 MG tablet Take 100 mg by mouth 3 (three) times daily.    JANUVIA 100 mg Tab Take 100 mg by mouth once daily.     levothyroxine (SYNTHROID) 50 MCG tablet Take 50 mcg by mouth once daily.    metformin (GLUCOPHAGE) 1000 MG tablet Take 1,000 mg by mouth 2 (two) times daily with meals.    metoprolol succinate (TOPROL-XL) 100 MG 24 hr tablet  Take 100 mg by mouth once daily.     mirabegron 50 mg Tb24 Take 1 tablet (50 mg total) by mouth once daily.    multivitamin (ONE DAILY MULTIVITAMIN) per tablet Take 1 tablet by mouth once daily.    nitrofurantoin, macrocrystal-monohydrate, (MACROBID) 100 MG capsule Take 1 capsule (100 mg total) by mouth 2 (two) times daily.    oxybutynin (DITROPAN XL) 15 MG TR24 Take 5 mg by mouth once daily.    sertraline (ZOLOFT) 50 MG tablet Take 50 mg by mouth once daily.     sildenafil (REVATIO) 20 mg Tab Take 20 mg by mouth 3 (three) times daily. Discontinued 9/13/13--to start Adcirca    spironolactone (ALDACTONE) 100 MG tablet Take 100 mg by mouth once daily.    valsartan (DIOVAN) 320 MG tablet Take 320 mg by mouth once daily.    vitamin D 1000 units Tab Take 185 mg by mouth once daily.     Family History     Problem Relation (Age of Onset)    Heart failure Mother    Hypertension Father    Hypoglycemic Mother    Pacemaker/defibrilator Mother    Prostate cancer Father        Social History Main Topics    Smoking status: Never Smoker    Smokeless tobacco: Not on file    Alcohol use No    Drug use: No    Sexual activity: Yes     Partners: Male     Review of Systems   Unable to perform ROS: Intubated   Constitutional: Positive for fever (Abnormal thermoregulation (Fluctuating hypothermia / hyperthemria) ).   Neurological: Positive for weakness. Negative for seizures and facial asymmetry.     Objective:     Vital Signs (Most Recent):  Temp: 99.6 °F (37.6 °C) (02/03/18 1901)  Pulse: 80 (02/03/18 1906)  Resp: 18 (02/03/18 1906)  SpO2: (!) 93 % (02/03/18 1906) Vital Signs (24h Range):  Temp:  [95.8 °F (35.4 °C)-107.6 °F (42 °C)] 99.6 °F (37.6 °C)  Pulse:  [] 80  Resp:  [2-30] 18  SpO2:  [92 %-96 %] 93 %  Arterial Line BP: ()/() 115/52     Weight: 131.7 kg (290 lb 5.5 oz)  Body mass index is 48.32 kg/m².    Physical Exam   HENT:   Head: Normocephalic.   Intubated    Eyes: Pupils are equal, round, and  reactive to light.   Sluggish / small    Neck: Neck supple.   Cardiovascular:   Afib + / On pressor support / amio infusion    Pulmonary/Chest: She has wheezes.   Ventilated / On AC mode / + breaths above vent   Abdominal: Soft.   Neurological: Finger-nose-finger test: Unable to test / Comatose.   Reflex Scores:       Tricep reflexes are 1+ on the right side and 1+ on the left side.       Bicep reflexes are 1+ on the right side and 1+ on the left side.       Brachioradialis reflexes are 1+ on the right side and 1+ on the left side.       Patellar reflexes are 1+ on the right side and 1+ on the left side.      NEUROLOGICAL EXAMINATION:     MENTAL STATUS   Disoriented to person: Not on sedation / Comatose    Level of consciousness: Not on sedation / Comatose     CRANIAL NERVES     CN II   Visual acuity: (inability to test)    CN III, IV, VI   Pupils are equal, round, and reactive to light.  Pupils: (Sluggish reactivity)  Right pupil: Size: 2 mm.   Left pupil: Size: 2 mm.   Ophthalmoparesis: Abnormal oculocephalic reflex.    CN V   Right corneal reflex: abnormal  Left corneal reflex: abnormal  Jaw jerk: abnormal    CN VII   Facial expression full, symmetric.     CN IX, X   Right gag reflex: abnormal  Left gag reflex: abnormal    CN XII   Tongue: not atrophic  Tongue deviation: none    MOTOR EXAM   Muscle bulk: normal  Overall muscle tone: normal       LLE: Flexion withdrawal to pain   None in other extremities     REFLEXES     Reflexes   Right brachioradialis: 1+  Left brachioradialis: 1+  Right biceps: 1+  Left biceps: 1+  Right triceps: 1+  Left triceps: 1+  Right patellar: 1+  Left patellar: 1+  Right plantar: equivocal  Left plantar: equivocal    SENSORY EXAM        LLE: Flexion withdrawal to pain   None in other extremities     GAIT AND COORDINATION     Gait  Gait: (Unable to test / Comatose)     Coordination   Finger-nose-finger test: Unable to test / Comatose.    Tremor   Resting tremor: absent      Significant  Labs:   Hemoglobin A1c:   Recent Labs  Lab 02/03/18  0525   HGBA1C 7.7*     Blood Culture:   Recent Labs  Lab 02/03/18  0523 02/03/18  0533   LABBLOO No Growth to date No Growth to date     CBC:   Recent Labs  Lab 02/03/18  0525   WBC 18.41*   HGB 13.5   HCT 41.5        CMP:   Recent Labs  Lab 02/03/18  0525   *   *   K 4.8      CO2 23   BUN 30*   CREATININE 2.4*   CALCIUM 8.8   MG 1.5*   PROT 6.9   ALBUMIN 3.1*   BILITOT 1.4*   ALKPHOS 99   *   *   ANIONGAP 12   EGFRNONAA 20.6*     CSF Studies: No results for input(s): ALIQUT, APPEARCSF, COLORCSF, CSFWBC, CSFRBC, GLUCCSF, LDHCSF, PROTEINCSF, VDRLCSF in the last 48 hours.  Inflammatory Markers: No results for input(s): SEDRATE, CRP, PROCAL in the last 48 hours.  POCT Glucose:   Recent Labs  Lab 02/03/18  1241 02/03/18  1750   POCTGLUCOSE 141* 120*     Urine Studies:   Recent Labs  Lab 02/03/18  0525   COLORU Sarah   APPEARANCEUA Cloudy*   PHUR 5.0   SPECGRAV >=1.030*   PROTEINUA 3+*   GLUCUA 1+*   KETONESU Negative   BILIRUBINUA Negative   OCCULTUA 3+*   NITRITE Negative   UROBILINOGEN Negative   LEUKOCYTESUR Negative   RBCUA 2   WBCUA 4   BACTERIA Few*   SQUAMEPITHEL 1   HYALINECASTS 0     All pertinent lab results from the past 24 hours have been reviewed.      Significant Imaging: I have reviewed and interpreted all pertinent imaging results/findings within the past 24 hours.

## 2018-02-04 NOTE — ASSESSMENT & PLAN NOTE
MRI (2/3) Extensive areas of acute/subacute infarct in bilateral cerebral and cerebellar hemispheres, and kermit and posterior right medulla as detailed above.  This is most likely embolic in nature.

## 2018-02-04 NOTE — PROGRESS NOTES
No acute events throughout day. See vital signs and assessments in flowsheets. See below for updates on today's progress.     Pulmonary: remains intubated with no change in settings with sats 94-98%    Cardiovascular: remains in Afib with HR 70-90s, requiring levo gtt for MAPs >65    Neurological: negative corneal reflex, cough/gag, only withdraws from pain in left lower extremity. No sedatives given.     Gastrointestinal: no BM this shift    Genitourinary: lee exchanged this am, low UOP - team aware.     Endocrine: accuchecks Q6H - no coverage needed    Integumentary/Other: MRI of head and CT of chest done this afternoon - Dr. Moss discussed prelim results with  and 2 sons.     Infusions: Levo, Amio    Patient progressing towards goals as tolerated, plan of care communicated and reviewed with Rena Quintana and family. All concerns addressed. Will continue to monitor.

## 2018-02-04 NOTE — PROGRESS NOTES
[facility]  Critical Care Medicine  Progress Note    Patient Name: Rena Quintana  MRN: [id number]  Admission Date: 2/3/2018  Hospital Length of Stay: 1 days  Code Status: Full Code  Attending Provider: Farzad Moss MD  Primary Care Provider: Yaritza Yanes MD   Principal Problem: Aorto-esophageal fistula    Subjective:     HPI:  Ms. Quintana is a 67 y.o F with a history significant for pulmonary HTN on home milrinone, CAD s/p EN, HFpEF, CIARAN, morbid obesity, persist afib s/p PVI ablation on 1/5/18 and then presented on 1/29 with symptoms of fever, also had N/V/D.     In the ED she had two witnessed seizures and was started on keppra. EEG no epileptiform abnormalities captured. MRI was significant for an area of restricted diffusion in her right frontoparietal lobe that was predominantly cortical and spared the white matter, but with T2 changes that appear c/w cytotoxic edema.      She was found to be bacteremic (lactobascillus). There was concern for bacterial meningitis vs. Stroke. Initially the PICC line line was thought to be the source of the infection but an atrial-esophageal fistula was also on the differential. On 1/30 esophagram was performed which did not show extravasation to suggest fistula. Today (2/2/18), she had an LP performed and one hour after suffered a PEA and underwent ACLS protocol with ROCS. 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     Patient was evaluated by GI at [facility], who recommended transferring the patient to [facility]; as they do not have personal to assist with esophageal stenting.              Hospital/ICU Course:  2/3: transferred to [facility]   2/4: CT scan done. Small linear opacity extending from the posterior wall the left atrium to the esophagus, given provided history, likely fistula tract from left atrium to esophagus.     Interval  History/Significant Events:   NAEON. CT scan done. Small linear opacity extending from the posterior wall the left atrium to the esophagus, given provided history, likely fistula tract from left atrium to esophagus. Per review of recent consult notes; no intervention.     Review of Systems   Unable to perform ROS: Acuity of condition     Objective:     Vital Signs (Most Recent):  Temp: (!) 100.4 °F (38 °C) (02/04/18 0720)  Pulse: 104 (02/04/18 0800)  Resp: 15 (02/04/18 0800)  BP: (!) 98/57 (02/04/18 0800)  SpO2: (!) 94 % (02/04/18 0800) Vital Signs (24h Range):  Temp:  [95.8 °F (35.4 °C)-103.6 °F (39.8 °C)] 100.4 °F (38 °C)  Pulse:  [] 104  Resp:  [2-30] 15  SpO2:  [92 %-96 %] 94 %  BP: (98)/(57) 98/57  Arterial Line BP: ()/(49-64) 111/51   Weight: (!) 136.1 kg (300 lb 0.7 oz)  Body mass index is 54.88 kg/m².      Intake/Output Summary (Last 24 hours) at 02/04/18 0834  Last data filed at 02/04/18 0800   Gross per 24 hour   Intake          2190.24 ml   Output              455 ml   Net          1735.24 ml       Physical Exam   Constitutional: She appears ill. She is sedated, intubated and restrained.   HENT:   Head: Normocephalic.   Right Ear: External ear normal.   Left Ear: External ear normal.   Nose: Nose normal.   Mouth/Throat: Oropharynx is clear and moist.   Eyes: Pupils are equal, round, and reactive to light.   Neck: No JVD present. No tracheal deviation present.   Cardiovascular: Intact distal pulses.    Pulmonary/Chest: No stridor. She is intubated. She has no wheezes. She has no rales. She exhibits no tenderness.   Abdominal: Soft.   Neurological: She is unresponsive.   Skin: Skin is warm. Capillary refill takes 2 to 3 seconds.   Nursing note and vitals reviewed.      Vents:  Vent Mode: A/C (02/04/18 0732)  Ventilator Initiated: Yes (02/03/18 0552)  Set Rate: 12 bmp (02/04/18 0732)  Vt Set: 450 mL (02/04/18 0732)  PEEP/CPAP: 5 cmH20 (02/04/18 0732)  Oxygen Concentration (%): 50 (02/04/18  0800)  Peak Airway Pressure: 35 cmH2O (02/04/18 0732)  Plateau Pressure: 26 cmH20 (02/04/18 0732)  Total Ve: 7.79 mL (02/04/18 0732)  F/VT Ratio<105 (RSBI): (!) 33.97 (02/04/18 0732)  Lines/Drains/Airways     Central Venous Catheter Line                 Port A Cath Single Lumen right subclavian -- days          Drain                 Urethral Catheter 02/03/18 0823 1 day          Airway                 Airway - Non-Surgical 02/02/18 Endotracheal Tube 2 days          Arterial Line                 Arterial Line 02/02/18 Left Radial 2 days          Peripheral Intravenous Line                 Peripheral IV - Single Lumen 02/02/18 Right Upper Arm 2 days         Peripheral IV - Single Lumen 02/03/18 0525 Right Hand 1 day              Significant Labs:    CBC/Anemia Profile:    Recent Labs  Lab 02/03/18 0525 02/04/18 0615   WBC 18.41* 20.00*   HGB 13.5 13.2   HCT 41.5 40.9    101*   MCV 86 86   RDW 15.2* 16.1*        Chemistries:    Recent Labs  Lab 02/03/18 0525 02/04/18 0615   * 132*   K 4.8 4.4    97   CO2 23 20*   BUN 30* 50*   CREATININE 2.4* 3.8*   CALCIUM 8.8 8.4*   ALBUMIN 3.1* 2.8*   PROT 6.9 6.4   BILITOT 1.4* 1.9*   ALKPHOS 99 87   * 958*   * 815*   MG 1.5* 2.2   PHOS 2.3* 7.0*       Bilirubin:   Recent Labs  Lab 02/03/18 0525 02/04/18 0615   BILITOT 1.4* 1.9*     Blood Culture:   Recent Labs  Lab 02/03/18 0523 02/03/18  0533   LABBLOO No Growth to date No Growth to date     CMP:   Recent Labs  Lab 02/03/18 0525 02/04/18 0615   * 132*   K 4.8 4.4    97   CO2 23 20*   * 226*   BUN 30* 50*   CREATININE 2.4* 3.8*   CALCIUM 8.8 8.4*   PROT 6.9 6.4   ALBUMIN 3.1* 2.8*   BILITOT 1.4* 1.9*   ALKPHOS 99 87   * 815*   * 958*   ANIONGAP 12 15   EGFRNONAA 20.6* 11.8*     Coagulation:   Recent Labs  Lab 02/04/18  0615   INR 1.4*     Lactic Acid:   Recent Labs  Lab 02/03/18  0525   LACTATE 1.9     All pertinent labs within the past 24 hours have been  reviewed.    Significant Imaging:  CT: I have reviewed all pertinent results/findings within the past 24 hours and my personal findings are:  CT scan done. Small linear opacity extending from the posterior wall the left atrium to the esophagus, given provided history, likely fistula tract from left atrium to esophagus.     Assessment/Plan:     Neuro   Anoxic brain injury     MRI (2/3) Extensive areas of acute/subacute infarct in bilateral cerebral and cerebellar hemispheres, and kermit and posterior right medulla as detailed above.  This is most likely embolic in nature.        H/O ischemic right MCA stroke     MRI (2/3) Extensive areas of acute/subacute infarct in bilateral cerebral and cerebellar hemispheres, and kermit and posterior right medulla as detailed above.  This is most likely embolic in nature.        New onset seizure    EEG no epileptiform abnormalities captured. MRI was significant for an area of restricted diffusion in her right frontoparietal lobe that was predominantly cortical and spared the white matter, but with T2 changes that appear c/w cytotoxic edema.      - continue keppra 500 mg IV BID          Pulmonary   Pulmonary HTN    Hold home milrinone, sildenafil          Cardiac/Vascular   PEA (Pulseless electrical activity)    - cont to monitor  - not currently being cooled        CAD (coronary artery disease)    S/p PCI    - holding ASA  - hold statin         HTN (hypertension)    Given sepsis  - hold valsartan 325, amlodipine 10, spironolactone 100        A-fib    S/p ablation 1/5/18    - continue amiodarone gtt  - holding eliquis possibly procedure today   - 2d echo        CHF (congestive heart failure)    - Repeat Echo  - holding ARB, BB, spironolactone         ID   Bacteremia    Lactobacillus; 2 sets blood cultures positive from [facility]    - repeat blood cultures   - started Vanc and zosyn  - monitor CBC          Endocrine   Hypothyroidism (acquired)    - continue home dose synthroid; switch to  IV        DM (diabetes mellitus)    - low dose sliding scale   - accuchecks q6hr         GI   * Suspected aorto-esophageal fistula    (2/2/18) CT PE protocol was performed and a small air bubble was seen posterior to the the left atrium anterior to the esophagus (concerning for atrial-esophageal fistula).       - NPO  - GI advanced endoscopy service consulted; rec's appreciated   - CTS rec's appreciated  - ID rec's appreciated  - Neurology rec's appreciated  - CT scan (2/3) Small linear opacity extending from the posterior wall the left atrium to the esophagus, given provided history, likely fistula tract from left atrium to esophagus  - MRI (2/3) Extensive areas of acute/subacute infarct in bilateral cerebral and cerebellar hemispheres, and kermit and posterior right medulla as detailed above.  This is most likely embolic in nature.  - No intervention               Critical Care Daily Checklist:    A: Awake: RASS Goal/Actual Goal:    Actual: Cee Agitation Sedation Scale (RASS): (P) Unarousable   B: Spontaneous Breathing Trial Performed?     C: SAT & SBT Coordinated?  no                      D: Delirium: CAM-ICU Overall CAM-ICU: (P) Positive   E: Early Mobility Performed? No   F: Feeding Goal: Goals: Meet % EEN, EPN  Status: Nutrition Goal Status: new   Current Diet Order   Procedures    Diet NPO      AS: Analgesia/Sedation yes   T: Thromboembolic Prophylaxis    H: HOB > 300 Yes   U: Stress Ulcer Prophylaxis (if needed) no   G: Glucose Control yes   B: Bowel Function     I: Indwelling Catheter (Lines & Huerta) Necessity yes   D: De-escalation of Antimicrobials/Pharmacotherapies no    Plan for the day/ETD Family discussion    Code Status:  Family/Goals of Care: Full Code  Possibly today             Critical care was time spent personally by me on the following activities: development of treatment plan with patient or surrogate and bedside caregivers, discussions with consultants, evaluation of patient's  response to treatment, examination of patient, ordering and performing treatments and interventions, ordering and review of laboratory studies, ordering and review of radiographic studies, pulse oximetry, re-evaluation of patient's condition. This critical care time did not overlap with that of any other provider or involve time for any procedures.     Calderon Clark MD  Critical Care Medicine  [facility]

## 2018-02-04 NOTE — ASSESSMENT & PLAN NOTE
- rate and rhythm control as per primary  - significant stroke risk factor / not on active AC for ? Interventional procedure  - risks vs benefits weighed / hold AC for now

## 2018-02-04 NOTE — CONSULTS
[facility]  Thoracic Surgery  Consult Note    Patient Name: Rena Quintana  MRN: [id number]  Code Status: Full Code  Admission Date: 2/3/2018  Hospital Length of Stay: 0 days  Primary Care Provider: Yaritza Yanes MD    Inpatient consult to Cardiothoracic Surgery  Consult performed by: MIGUEL LOPEZ  Consult ordered by: MARYANNE LOMELI        Subjective:     Reason for Consult: Suspected atrio-esophageal fistula.     History of Present Illness: 67 year old female with a history of HTN, HLD, CIARAN, CAD s/p EN, CHF on home milrinone for Pulmonary hypertension. She was transferred from [facility] for suspected atrial-esophageal fistula. She had a trans-septal pulmonary vein isolation in the cath lab 1/5/2018 and was discharged same day. On the 29th she presented to the emergency department with fever, malaise, nausea, vomitting, diarrhea and chest pain. She was found to be in Afib RVR which converted after amio bolus x2. She had 2 seizures versus TIAs with preferential gaze and arm stiffening. MRI showed acute and subacute ischemia of temporo-parietal occipital region with chronic microvascular changes. CTA chest report reads small outpouch of the left atrium with adjacent air collect potentially contained int the esophageal lumen. No contrast extravasation. UGI/Barium report shows esophagus demonstrates smooth mucosa without evidence of abnormal stricture, narrowing, outpouching, or filling defect. Contrast was identified transiting the esophagus into the stomach through a normal GE junction. No evidence of extravasation or stricture was seen.     LP done 2 days later complicated by cardiopulmonary arrest. ROSC after 10 minutes. Repeat chest CTA report reads focus of air is noted within the posterior wall of the left atrium, presumably site of ablation finding can be seen with clinical concern for atrial-esophageal fistula. No evidence of central PE BL airspace consolidation of the lungs. Repeat  head CT shows evolving vascular injury. After her code and after CT chest PE protocol. Critical care team thought patient could indeed have an atrial-esophageal fistula. Spoke to GI who felt that in some instance an esophageal stent could help and therefore was transferred here for further evaluation as [facility] did not have capability for stent placement.           No current facility-administered medications on file prior to encounter.      Current Outpatient Prescriptions on File Prior to Encounter   Medication Sig    amiodarone (PACERONE) 200 MG Tab     amlodipine (NORVASC) 10 MG tablet Take 10 mg by mouth once daily.    atorvastatin (LIPITOR) 40 MG tablet Take 40 mg by mouth once daily.    ciprofloxacin HCl (CIPRO) 500 MG tablet Take 1 tablet (500 mg total) by mouth 2 (two) times daily.    ELIQUIS 5 mg Tab 5 mg 2 (two) times daily.     fluticasone (FLONASE) 50 mcg/actuation nasal spray 1 spray by Nasal route as needed.     furosemide (LASIX) 40 MG tablet Take 1 tablet (40 mg total) by mouth once daily. (Patient taking differently: Take 40 mg by mouth as needed. )    glimepiride (AMARYL) 4 MG tablet Take 4 mg by mouth daily with breakfast.     hydrALAZINE (APRESOLINE) 100 MG tablet Take 100 mg by mouth 3 (three) times daily.    JANUVIA 100 mg Tab Take 100 mg by mouth once daily.     levothyroxine (SYNTHROID) 50 MCG tablet Take 50 mcg by mouth once daily.    metformin (GLUCOPHAGE) 1000 MG tablet Take 1,000 mg by mouth 2 (two) times daily with meals.    metoprolol succinate (TOPROL-XL) 100 MG 24 hr tablet Take 100 mg by mouth once daily.     mirabegron 50 mg Tb24 Take 1 tablet (50 mg total) by mouth once daily.    multivitamin (ONE DAILY MULTIVITAMIN) per tablet Take 1 tablet by mouth once daily.    nitrofurantoin, macrocrystal-monohydrate, (MACROBID) 100 MG capsule Take 1 capsule (100 mg total) by mouth 2 (two) times daily.    oxybutynin (DITROPAN XL) 15 MG TR24 Take 5 mg by mouth once daily.     sertraline (ZOLOFT) 50 MG tablet Take 50 mg by mouth once daily.     sildenafil (REVATIO) 20 mg Tab Take 20 mg by mouth 3 (three) times daily. Discontinued 9/13/13--to start Adcirca    spironolactone (ALDACTONE) 100 MG tablet Take 100 mg by mouth once daily.    valsartan (DIOVAN) 320 MG tablet Take 320 mg by mouth once daily.    vitamin D 1000 units Tab Take 185 mg by mouth once daily.       Review of patient's allergies indicates:   Allergen Reactions    Cardizem [diltiazem hcl] Hives, Shortness Of Breath and Other (See Comments)     Procardia    Procardia [nifedipine]      Low BP       Past Medical History:   Diagnosis Date    A-fib     CHF (congestive heart failure)     dr. olivares    Coronary artery disease     Diabetes mellitus     Dyslipidemia     H/O ischemic right MCA stroke 2/3/2018    Hypertension     New onset seizure 2/3/2018    Obstructive sleep apnea     Pulmonary hypertension     dr. MARIO    Thyroid disease      Past Surgical History:   Procedure Laterality Date    APPENDECTOMY      ATRIAL ABLATION SURGERY      didn't work for her afib    ELBOW SURGERY      FOOT SURGERY      HYSTERECTOMY      KNEE SURGERY       Family History     Problem Relation (Age of Onset)    Heart failure Mother    Hypertension Father    Hypoglycemic Mother    Pacemaker/defibrilator Mother    Prostate cancer Father        Social History Main Topics    Smoking status: Never Smoker    Smokeless tobacco: Not on file    Alcohol use No    Drug use: No    Sexual activity: Yes     Partners: Male     Review of Systems   Unable to perform ROS: Intubated     Objective:     Vital Signs (Most Recent):  Temp: 99.6 °F (37.6 °C) (02/03/18 1901)  Pulse: 88 (02/03/18 2103)  Resp: 13 (02/03/18 2103)  SpO2: (!) 93 % (02/03/18 2103) Vital Signs (24h Range):  Temp:  [95.8 °F (35.4 °C)-107.6 °F (42 °C)] 99.6 °F (37.6 °C)  Pulse:  [] 88  Resp:  [2-30] 13  SpO2:  [92 %-96 %] 93 %  Arterial Line BP:  ()/() 128/53     Weight: 131.7 kg (290 lb 5.5 oz)  Body mass index is 48.32 kg/m².  ECOG Performance Status Grade: 4 - Completely disabled    Intake/Output - Last 3 Shifts       02/02 0700 - 02/03 0659 02/03 0700 - 02/04 0659    I.V. (mL/kg)  658.2 (5)    IV Piggyback 200 700    Total Intake(mL/kg) 200 (1.5) 1358.2 (10.3)    Urine (mL/kg/hr) 125 365 (0.2)    Total Output 125 365    Net +75 +993.2                SpO2: (!) 93 %  O2 Device (Oxygen Therapy): ventilator    Physical Exam   Constitutional: She appears ill. She is sedated, intubated and restrained.   HENT:   Head: Normocephalic.   Right Ear: External ear normal.   Left Ear: External ear normal.   Nose: Nose normal.   Mouth/Throat: Oropharynx is clear and moist.   Neck: No JVD present. No tracheal deviation present.   Cardiovascular: Intact distal pulses.    Pulmonary/Chest: No stridor. She is intubated. She has no wheezes. She has no rales. She exhibits no tenderness.   Abdominal: Soft.   Neurological: She is unresponsive.   Skin: Skin is warm. Capillary refill takes 2 to 3 seconds.   Nursing note and vitals reviewed.      Significant Labs:  ABGs:   Recent Labs  Lab 02/03/18  0552   PH 7.449   PCO2 38.6   PO2 87   HCO3 26.7   POCSATURATED 97   BE 3     BMP:   Recent Labs  Lab 02/03/18  0525   *   *   K 4.8      CO2 23   BUN 30*   CREATININE 2.4*   CALCIUM 8.8   MG 1.5*     Cardiac markers: No results for input(s): CKMB, CPKMB, TROPONINT, TROPONINI, MYOGLOBIN in the last 48 hours.  CBC:   Recent Labs  Lab 02/03/18  0525   WBC 18.41*   RBC 4.81   HGB 13.5   HCT 41.5      MCV 86   MCH 28.1   MCHC 32.5     Lactic Acid:   Recent Labs  Lab 02/03/18  0525   LACTATE 1.9     LFTs:   Recent Labs  Lab 02/03/18  0525   *   *   ALKPHOS 99   BILITOT 1.4*   PROT 6.9   ALBUMIN 3.1*     All significant labs over 24 hours reviewed    Significant Diagnostics:  CT: I have reviewed all pertinent results/findings within the  past 24 hours  CXR: I have reviewed all pertinent results/findings within the past 24 hours  Echo: I have reviewed all pertinent results/findings within the past 24 hours  EKG: I have reviewed all pertinent results/findings within the past 24 hours  I have reviewed and interpreted all pertinent imaging results/findings within the past 24 hours.     CTA from 1/30 was available to review and interpret. There is some air between the aorta and the inferior pulmonary vein in the lung parenchyma, and this continues along posterior to the lower lobe bronchus and ongoing pulmonary artery. No contrast extravasated into the esophagus. No air in the middle mediastinum.     VTE Risk Mitigation         Ordered     Medium Risk of VTE  Once      02/03/18 0443     Place sequential compression device  Until discontinued      02/03/18 0443        Assessment/Plan:     * Suspected aorto-esophageal fistula    Suspected atrial-esophageal fistula:   After extensive review of the records we are unsure if patient truly has an atrial-esophageal fistula. After atrial esophageal fistula first considered 3 days have passed. There has been no hematemesis or bloody gastric contents. No hematochezia or melena. The esophogram which was negative. She has not had evidence of another stroke from air entering the atrium. At this point endoscopy will not be helpful and may be harmful. Management will be non-operative.             Thank you for your consult. I will follow-up with patient. Please contact us if you have any additional questions.    Esau Scott MD  Thoracic Surgery  [facility]

## 2018-02-04 NOTE — DISCHARGE SUMMARY
[facility]  Critical Care Medicine  Discharge Summary      Patient Name: Rena Quintana  MRN: [id number]  Admission Date: 2/3/2018  Hospital Length of Stay: 1 days  Discharge Date and Time:  02/04/2018 5:20 PM  Attending Physician: Farzad Moss MD   Discharging Provider: Zonia Mata MD  Primary Care Provider: Yaritza Yanes MD  Reason for Admission: transferred from [facility] for high level of care.    HPI:   Ms. Quintana is a 67 y.o F with a history significant for pulmonary HTN on home milrinone, CAD s/p EN, HFpEF, CIARAN, morbid obesity, persist afib s/p PVI ablation on 1/5/18 and then presented on 1/29 with symptoms of fever, also had N/V/D.     In the ED she had two witnessed seizures and was started on keppra. EEG no epileptiform abnormalities captured. MRI was significant for an area of restricted diffusion in her right frontoparietal lobe that was predominantly cortical and spared the white matter, but with T2 changes that appear c/w cytotoxic edema.      She was found to be bacteremic (lactobascillus). There was concern for bacterial meningitis vs. Stroke. Initially the PICC line line was thought to be the source of the infection but an atrial-esophageal fistula was also on the differential. On 1/30 esophagram was performed which did not show extravasation to suggest fistula. Today (2/2/18), she had an LP performed and one hour after suffered a PEA and underwent ACLS protocol with ROCS. 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     Patient was evaluated by GI at [facility], who recommended transferring the patient to [facility]; as they do not have personal to assist with esophageal stenting.              * No surgery found *    Indwelling Lines/Drains at Time of Discharge:   Lines/Drains/Airways     Central Venous Catheter Line                 Port A Cath Single  Lumen right subclavian -- days          Drain                 Urethral Catheter 02/03/18 0823 1 day          Arterial Line                 Arterial Line 02/02/18 Left Radial 2 days              Hospital Course:   [facility] on the 02[facility]03, CT scan done. 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 On 02/04 Discussed goal of care reviewed with the family that agreed on comfort care, soon after comfort care started pt passed away, death was announced 16:35.    Consults         Status Ordering Provider     Inpatient consult to Advanced Endoscopy Service (AES)  Once     Provider:  (Not yet assigned)    Completed EMILY CARLSON     Inpatient consult to Cardiothoracic Surgery  Once     Provider:  (Not yet assigned)    Completed MARYANNE LOMELI     Inpatient consult to Infectious Diseases  Once     Provider:  (Not yet assigned)    Completed TREVON JOHNSON     Inpatient consult to Neurology  Once     Provider:  (Not yet assigned)    Completed MARYANNE LOMELI     Inpatient consult to Registered Dietitian/Nutritionist  Once     Provider:  (Not yet assigned)    Completed BERENICE MANDUJANO        Significant Labs:  Bilirubin:   Recent Labs  Lab 02/03/18  0525 02/04/18  0615   BILITOT 1.4* 1.9*     Blood Culture:   Recent Labs  Lab 02/03/18  0523 02/03/18  0533   LABBLOO No Growth to date  No Growth to date No Growth to date  No Growth to date     BMP:   Recent Labs  Lab 02/04/18  0615   *   *   K 4.4   CL 97   CO2 20*   BUN 50*   CREATININE 3.8*   CALCIUM 8.4*   MG 2.2     CMP:   Recent Labs  Lab 02/03/18  0525 02/04/18  0615   * 132*   K 4.8 4.4    97   CO2 23 20*   * 226*   BUN 30* 50*   CREATININE 2.4* 3.8*   CALCIUM 8.8 8.4*   PROT 6.9  6.4   ALBUMIN 3.1* 2.8*   BILITOT 1.4* 1.9*   ALKPHOS 99 87   * 815*   * 958*   ANIONGAP 12 15   EGFRNONAA 20.6* 11.8*     Cardiac Markers: No results for input(s): CKMB, TROPONINT, MYOGLOBIN in the last 48 hours.  Coagulation:   Recent Labs  Lab 02/04/18  0615   INR 1.4*     Lactic Acid:   Recent Labs  Lab 02/03/18  0525   LACTATE 1.9     Lipid Panel: No results for input(s): CHOL, HDL, LDLCALC, TRIG, CHOLHDL in the last 48 hours.  POCT Glucose:   Recent Labs  Lab 02/03/18  1750 02/04/18  0119 02/04/18  1245   POCTGLUCOSE 120* 216* 234*     Prealbumin: No results for input(s): PREALBUMIN in the last 48 hours.  Respiratory Culture: No results for input(s): GSRESP, RESPIRATORYC in the last 48 hours.  Troponin: No results for input(s): TROPONINI in the last 48 hours.  Urine Culture: No results for input(s): LABURIN in the last 48 hours.  Urine Studies:   Recent Labs  Lab 02/03/18  0525   COLORU Sarah   APPEARANCEUA Cloudy*   PHUR 5.0   SPECGRAV >=1.030*   PROTEINUA 3+*   GLUCUA 1+*   KETONESU Negative   BILIRUBINUA Negative   OCCULTUA 3+*   NITRITE Negative   UROBILINOGEN Negative   LEUKOCYTESUR Negative   RBCUA 2   WBCUA 4   BACTERIA Few*   SQUAMEPITHEL 1   HYALINECASTS 0       Significant Imaging:  Ct- Chest 02/03/2018:    Small linear opacity extending from the posterior wall the left atrium to the esophagus, given provided history, likely fistula tract from left atrium to esophagus.    Consolidation in posterior aspect of bilateral lower lobes, atelectasis versus aspiration/pneumonia.    Aortic and coronary atherosclerotic calcifications.    Gallbladder stone and sludge.    Other findings as above.      MRI- Brain 02/03/2018:    Extensive areas of acute/subacute infarct in bilateral cerebral and cerebellar hemispheres, and kermit and posterior right medulla as detailed above.  This is most likely embolic in nature.    Loss of T2 flow void in left vertebral artery, may reflect underlying  occlusion.    Left basal ganglia remote lacunar type infarct.      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] 02/02/2018 Yes    Febrile [R50.9] 02/04/2018 Unknown    Anoxic brain injury [G93.1] 02/04/2018 Yes    Bacteremia [R78.81] 02/03/2018 Yes    New onset seizure [R56.9] 02/03/2018 Yes    PEA (Pulseless electrical activity) [I46.9] 02/03/2018 Yes    H/O ischemic right MCA stroke [Z86.73] 02/03/2018 Not Applicable    HTN (hypertension) [I10] 08/14/2013 Yes    Pulmonary HTN [I27.20] 08/14/2013 Yes    DM (diabetes mellitus) [E11.9] 08/14/2013 Yes    CAD (coronary artery disease) [I25.10] 08/14/2013 Yes    Hypothyroidism (acquired) [E03.9] 08/14/2013 Yes    A-fib [I48.91] 07/23/2013 Yes    CHF (congestive heart failure) [I50.9] 07/23/2013 Yes      Problems Resolved During this Admission:    Diagnosis Date Noted Date Resolved POA       Neuro   Anoxic brain injury/H/O ischemic right MCA stroke/New onset seizure      MRI (2/3) Extensive areas of acute/subacute infarct in bilateral cerebral and cerebellar hemispheres, and kermit and posterior right medulla as detailed above.  This is most likely embolic in nature.     EEG no epileptiform abnormalities captured. MRI was significant for an area of restricted diffusion in her right frontoparietal lobe that was predominantly cortical and spared the white matter, but with T2 changes that appear c/w cytotoxic edema.    -  keppra 500 mg IV BID was giving, neurology was consulted,    ** Pt was transferred intubated with a high fever, neurologically exam showed no with draw to pain, minimal cough reflux, reactive pupils, giving pt anoxic brain injury, aortoesophageal fistula, her septic shock, and bacteremia there is a poor chance to have significant and meaningful neurological recovery, Family discussion were made regarding pt goal of care, decided  "" and son" to start comfort care, Soon after pt was announced dead 16:35       Pulmonary   Pulmonary HTN     home milrinone, sildenafil were held in setting of sepsis.          Cardiac/Vascular   PEA (Pulseless electrical activity)/CAD (coronary artery disease)/HTN (hypertension)/A-fib/CHF (congestive heart failure)     S/p PCI  - ASA and statin were held.     Given sepsis  - valsartan 325, amlodipine 10, spironolactone 100 were held.     S/p ablation 18  - amiodarone gtt was giving due to Afib with RVR  - eliquis was held due to possibly procedure  - Pt required pressors due to her septic shock that was d/c after comfort measures were discussed and agreed by family     ID   Bacteremia     Lactobacillus; 2 sets blood cultures positive from [facility]  Vanc and zosyn were giving.       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   - Pt kept NPO  - GI advanced endoscopy service were consulted;  - CTS were consulted  - ID were consulted  - Neurology were consulted  - CT scan (2/3) Small linear opacity extending from the posterior wall the left atrium to the esophagus, given provided history, likely fistula tract from left atrium to esophagus  - MRI (2/3) Extensive areas of acute/subacute infarct in bilateral cerebral and cerebellar hemispheres, and kermit and posterior right medulla as detailed above.  This is most likely embolic in nature.  - No intervention            Discharged Condition:     Disposition:       Patient Instructions:   No discharge procedures on file.  Medications:  None (patient  at medical facility)     Zonia Mata MD  Critical Care Medicine  [facility]  "

## 2018-02-04 NOTE — HPI
67 year old female with a history of HTN, HLD, CIARAN, CAD s/p EN, CHF on home milrinone for Pulmonary hypertension. She was transferred from 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 She had a trans-septal pulmonary vein isolation in the cath lab 1/5/2018 and was discharged same day. On the 29th she presented to the emergency department with fever, malaise, nausea, vomitting, diarrhea and chest pain. She was found to be in Afib RVR which converted after amio bolus x2. She had 2 seizures versus TIAs with preferential gaze and arm stiffening. MRI showed acute and subacute ischemia of temporo-parietal occipital region with chronic microvascular changes. CTA chest report reads small outpouch of the left atrium with adjacent air collect potentially contained int the esophageal lumen. No contrast extravasation. UGI/Barium report shows esophagus demonstrates smooth mucosa without evidence of abnormal stricture, narrowing, outpouching, or filling defect. Contrast was identified transiting the esophagus into the stomach through a normal GE junction. No evidence of extravasation or stricture was seen.     LP done 2 days later complicated by cardiopulmonary arrest. ROSC after 10 minutes. Repeat chest CTA report reads focus of air is noted within the posterior wall of the left atrium, presumably site of ablation finding can be seen with clinical concern for atrial-esophageal fistula. No evidence of central PE BL airspace consolidation of the lungs. Repeat head CT shows evolving vascular injury. After her code and after CT chest PE protocol. Critical care team thought patient could indeed have an atrial-esophageal fistula. Spoke to GI who felt that in some instance an esophageal stent could help and therefore was transferred here for further evaluation as [facility] did not have capability for stent placement.

## 2018-02-04 NOTE — LOPA/MORA/SWTA/AOC/AEB
[facility] ([facility])  On-Site Evaluation  [facility] Contact # [phone]        Thank you for the referral of this patient to determine suitability for organ, tissue, and eye donation.  A chart review has been conducted (date):02/04/2018  at (time) 08:30 .  \[facility]\"" findings are as follows:        ? Potential for candidate for tissue and eye donation- call [facility] at [phone] within 4 hours of death for screening as a potential tissue and/or eye donor.      ?   [facility] Representative: EMMANUELLE StoverN, Rn, CCRN      [facility] Referral Number:  [id number]

## 2018-02-04 NOTE — ASSESSMENT & PLAN NOTE
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       - NPO  - GI advanced endoscopy service consulted; rec's appreciated   - CTS rec's appreciated  - ID rec's appreciated  - Neurology rec's appreciated  - CT scan (2/3) Small linear opacity extending from the posterior wall the left atrium to the esophagus, given provided history, likely fistula tract from left atrium to esophagus  - MRI (2/3) Extensive areas of acute/subacute infarct in bilateral cerebral and cerebellar hemispheres, and kermit and posterior right medulla as detailed above.  This is most likely embolic in nature.  - No intervention

## 2018-02-04 NOTE — SUBJECTIVE & OBJECTIVE
Interval History: No overnight events    Review of Systems   Unable to perform ROS: Intubated     Objective:     Vital Signs (Most Recent):  Temp: (!) 100.4 °F (38 °C) (02/04/18 0720)  Pulse: 103 (02/04/18 1110)  Resp: 12 (02/04/18 1110)  BP: (!) 98/57 (02/04/18 0800)  SpO2: (!) 92 % (02/04/18 1110) Vital Signs (24h Range):  Temp:  [95.8 °F (35.4 °C)-101.4 °F (38.6 °C)] 100.4 °F (38 °C)  Pulse:  [] 103  Resp:  [0-18] 12  SpO2:  [92 %-95 %] 92 %  BP: (98)/(57) 98/57  Arterial Line BP: ()/(49-61) 134/55     Weight: (!) 136.1 kg (300 lb 0.7 oz)  Body mass index is 54.88 kg/m².    Estimated Creatinine Clearance: 19.7 mL/min (based on SCr of 3.8 mg/dL (H)).    Physical Exam   Constitutional: She appears well-developed and well-nourished. She is intubated.   HENT:   Head: Normocephalic and atraumatic.   Eyes: Pupils are equal, round, and reactive to light.   Cardiovascular: Normal rate and normal heart sounds.    Pulmonary/Chest: Breath sounds normal. She is intubated.   Abdominal: Soft.   Neurological:   sedated       Significant Labs: All pertinent labs within the past 24 hours have been reviewed.    Significant Imaging: I have reviewed all pertinent imaging results/findings within the past 24 hours.

## 2018-02-04 NOTE — HPI
Rena Metcalf is a 67 y.o F with medical history significant for CIARAN, morbid obesity / DM / pulmonary HTN on home milrinone, CAD s/p EN, HFpEF, persist afib s/p PVI ablation on 1/5/18   presenting as transfer from [facility] for CTS / GI eval for ? atrial-esophageal fistula. Active concerns for sz / cortical ribboning / CA prior to transfer, hence neurology consulted for further prognostication for ongoing plans for active intervention.    Patient was admitted to [facility] on 1/29 for 1 day history of fever, malaise, N/V/D, and substernal chest pain. In the ED was found to be in Afib with RVR and had 2 episode of GTC, with Todds paralysis. Urgent neuro eval revealed right fronto parietal ribboning concerning for sz induced changes vs meningitis. Imaging also revealed prior sub-acute to chronic changes of right parieto-occipital stroke. EEG done was negative for interictal discharges. LP done revealed elevated WBC / with PMN predominance and was maintained on meningitis coverage and sz prophylaxis. CT Angio Chest w/wo contrast 1/30 revealed findings concerning for atrial esophageal fistula with BC positive for lactobacillus. On 2/2/18 patient had PEA and underwent ACLS protocol with ROCS in 10 mins. Was intubated and maintained on pressor support and amio infusion. CT PE protocol was performed revealing small air bubble  posterior to the the left atrium anterior to the esophagus concerning for atrial-esophageal fistula and was transferred for further CTS / GI eval.

## 2018-02-05 NOTE — ASSESSMENT & PLAN NOTE
Multifocal anterior and posterior circulation embolic pattern strokes. Possible etiologies include persistent A fib, recent PEA arrest, ?esophageo-atrial fistula. Given systemic infection/bacteremia, bi-hemispheric and brainstem infarcts; loss of most brainstem reflexes and ventilator dependence, chances for meaningful neurologic recovery is poor and this was discussed with the family.   Plan for withdrawal of care after discussion with primary team.

## 2018-02-05 NOTE — SUBJECTIVE & OBJECTIVE
Subjective:     Interval History:   No acute events overnight. No reported improvement in neurologic status. Not on sedation. On levophed and amiodarone ggt. WBC trending up. MRI brain with evidence of infarct involving multifocal vascular territories. Family discussion had regarding patient's poor prognosis in the setting of uncontrolled systemic infection and given extensive bi-hemispheric and brainstem infarcts.     Current Neurological Medications:   keppra 500mg BID    Current Facility-Administered Medications   Medication Dose Route Frequency Provider Last Rate Last Dose    fentaNYL 2500 mcg in 0.9% sodium chloride 250 mL infusion premix (titrating)  25 mcg/hr Intravenous Continuous Paige Rubio MD 10 mL/hr at 02/04/18 1602 100 mcg/hr at 02/04/18 1602    glycopyrrolate injection 0.2 mg  0.2 mg Intravenous Once Paige Rubio MD        levETIRAcetam in NaCl (iso-os) IVPB 500 mg  500 mg Intravenous Q12H Calderon Clark  mL/hr at 02/04/18 0918 500 mg at 02/04/18 0918    midazolam (VERSED) 1 mg/mL injection 2 mg  2 mg Intravenous Q15 Min PRN Paige Rubio MD   2 mg at 02/04/18 1620     No current outpatient prescriptions on file.       Review of Systems   Unable to perform ROS: Patient unresponsive     Objective:     Vital Signs (Most Recent):  Temp: 99.1 °F (37.3 °C) (02/04/18 1501)  Pulse: 94 (02/04/18 1624)  Resp: (!) 6 (02/04/18 1624)  BP: (!) 98/57 (02/04/18 0800)  SpO2: (!) 93 % (02/04/18 1624) Vital Signs (24h Range):  Temp:  [99.1 °F (37.3 °C)-101.4 °F (38.6 °C)] 99.1 °F (37.3 °C)  Pulse:  [] 94  Resp:  [6-17] 6  SpO2:  [91 %-95 %] 93 %  BP: (98)/(57) 98/57  Arterial Line BP: (102-136)/(49-58) 122/54     Weight: (!) 136.1 kg (300 lb 0.7 oz)  Body mass index is 54.88 kg/m².    Physical Exam   Constitutional: She appears well-developed and well-nourished.   Pulmonary/Chest:   intubated       NEUROLOGICAL EXAMINATION:     MENTAL STATUS        [id number]  No response to verbal  "stimuli  Mild flexion to pain in bilateral lower extremities, extension to pain in the upper extremities bilaterally.   2mm bilaterally, sluggishly reactive  Absent corneals, oculocephalic reflexes  Not breathing over the vent.          Significant Labs:   BMP:   Recent Labs  Lab 02/03/18  0525 02/04/18  0615   * 226*   * 132*   K 4.8 4.4    97   CO2 23 20*   BUN 30* 50*   CREATININE 2.4* 3.8*   CALCIUM 8.8 8.4*   MG 1.5* 2.2     CBC:   Recent Labs  Lab 02/03/18  0525 02/04/18  0615   WBC 18.41* 20.00*   HGB 13.5 13.2   HCT 41.5 40.9    101*     CMP:   Recent Labs  Lab 02/03/18 0525 02/04/18  0615   * 226*   * 132*   K 4.8 4.4    97   CO2 23 20*   BUN 30* 50*   CREATININE 2.4* 3.8*   CALCIUM 8.8 8.4*   MG 1.5* 2.2   PROT 6.9 6.4   ALBUMIN 3.1* 2.8*   BILITOT 1.4* 1.9*   ALKPHOS 99 87   * 815*   * 958*   ANIONGAP 12 15   EGFRNONAA 20.6* 11.8*     Inflammatory Markers: No results for input(s): SEDRATE, CRP, PROCAL in the last 48 hours.  Urine Culture: No results for input(s): LABURIN in the last 48 hours.  Urine Studies:   Recent Labs  Lab 02/03/18  0525   COLORU Sarah   APPEARANCEUA Cloudy*   PHUR 5.0   SPECGRAV >=1.030*   PROTEINUA 3+*   GLUCUA 1+*   KETONESU Negative   BILIRUBINUA Negative   OCCULTUA 3+*   NITRITE Negative   UROBILINOGEN Negative   LEUKOCYTESUR Negative   RBCUA 2   WBCUA 4   BACTERIA Few*   SQUAMEPITHEL 1   HYALINECASTS 0       Significant Imaging:     MRI brain WO contrast (2/3/18)  "Extensive areas of acute/subacute infarct in bilateral cerebral and cerebellar hemispheres, and kermit and posterior right medulla"          "

## 2018-02-05 NOTE — PROGRESS NOTES
[facility]  Neurology  Progress Note    Patient Name: Rena Quintana  MRN: [id number]  Admission Date: 2/3/2018  Hospital Length of Stay: 1 days  Code Status: Full Code   Attending Provider: No att. providers found  Primary Care Physician: Yaritza Yanes MD   Principal Problem:Aorto-esophageal fistula      Subjective:     Interval History:   No acute events overnight. No reported improvement in neurologic status. Not on sedation. On levophed and amiodarone ggt. WBC trending up. MRI brain with evidence of infarct involving multifocal vascular territories. Family discussion had regarding patient's poor prognosis in the setting of uncontrolled systemic infection and given extensive bi-hemispheric and brainstem infarcts.     Current Neurological Medications:   keppra 500mg BID    Current Facility-Administered Medications   Medication Dose Route Frequency Provider Last Rate Last Dose    fentaNYL 2500 mcg in 0.9% sodium chloride 250 mL infusion premix (titrating)  25 mcg/hr Intravenous Continuous Paige Rubio MD 10 mL/hr at 02/04/18 1602 100 mcg/hr at 02/04/18 1602    glycopyrrolate injection 0.2 mg  0.2 mg Intravenous Once Paige Rubio MD        levETIRAcetam in NaCl (iso-os) IVPB 500 mg  500 mg Intravenous Q12H Calderon Clark  mL/hr at 02/04/18 0918 500 mg at 02/04/18 0918    midazolam (VERSED) 1 mg/mL injection 2 mg  2 mg Intravenous Q15 Min PRN Paige Rubio MD   2 mg at 02/04/18 1620     No current outpatient prescriptions on file.       Review of Systems   Unable to perform ROS: Patient unresponsive     Objective:     Vital Signs (Most Recent):  Temp: 99.1 °F (37.3 °C) (02/04/18 1501)  Pulse: 94 (02/04/18 1624)  Resp: (!) 6 (02/04/18 1624)  BP: (!) 98/57 (02/04/18 0800)  SpO2: (!) 93 % (02/04/18 1624) Vital Signs (24h Range):  Temp:  [99.1 °F (37.3 °C)-101.4 °F (38.6 °C)] 99.1 °F (37.3 °C)  Pulse:  [] 94  Resp:  [6-17] 6  SpO2:  [91 %-95 %] 93 %  BP: (98)/(57)  "98/57  Arterial Line BP: (102-136)/(49-58) 122/54     Weight: (!) 136.1 kg (300 lb 0.7 oz)  Body mass index is 54.88 kg/m².    Physical Exam   Constitutional: She appears well-developed and well-nourished.   Pulmonary/Chest:   intubated       NEUROLOGICAL EXAMINATION:     MENTAL STATUS        [id number]  No response to verbal stimuli  Mild flexion to pain in bilateral lower extremities, extension to pain in the upper extremities bilaterally.   2mm bilaterally, sluggishly reactive  Absent corneals, oculocephalic reflexes  Not breathing over the vent.          Significant Labs:   BMP:   Recent Labs  Lab 02/03/18  0525 02/04/18  0615   * 226*   * 132*   K 4.8 4.4    97   CO2 23 20*   BUN 30* 50*   CREATININE 2.4* 3.8*   CALCIUM 8.8 8.4*   MG 1.5* 2.2     CBC:   Recent Labs  Lab 02/03/18  0525 02/04/18  0615   WBC 18.41* 20.00*   HGB 13.5 13.2   HCT 41.5 40.9    101*     CMP:   Recent Labs  Lab 02/03/18  0525 02/04/18  0615   * 226*   * 132*   K 4.8 4.4    97   CO2 23 20*   BUN 30* 50*   CREATININE 2.4* 3.8*   CALCIUM 8.8 8.4*   MG 1.5* 2.2   PROT 6.9 6.4   ALBUMIN 3.1* 2.8*   BILITOT 1.4* 1.9*   ALKPHOS 99 87   * 815*   * 958*   ANIONGAP 12 15   EGFRNONAA 20.6* 11.8*     Inflammatory Markers: No results for input(s): SEDRATE, CRP, PROCAL in the last 48 hours.  Urine Culture: No results for input(s): LABURIN in the last 48 hours.  Urine Studies:   Recent Labs  Lab 02/03/18  0525   COLORU Sarah   APPEARANCEUA Cloudy*   PHUR 5.0   SPECGRAV >=1.030*   PROTEINUA 3+*   GLUCUA 1+*   KETONESU Negative   BILIRUBINUA Negative   OCCULTUA 3+*   NITRITE Negative   UROBILINOGEN Negative   LEUKOCYTESUR Negative   RBCUA 2   WBCUA 4   BACTERIA Few*   SQUAMEPITHEL 1   HYALINECASTS 0       Significant Imaging:     MRI brain WO contrast (2/3/18)  "Extensive areas of acute/subacute infarct in bilateral cerebral and cerebellar hemispheres, and kermit and posterior right medulla"        "     Assessment and Plan:     * Embolic stroke    Multifocal anterior and posterior circulation embolic pattern strokes. Possible etiologies include persistent A fib, recent PEA arrest, ?esophageo-atrial fistula. Given systemic infection/bacteremia, bi-hemispheric and brainstem infarcts; loss of most brainstem reflexes and ventilator dependence, chances for meaningful neurologic recovery is poor and this was discussed with the family.   Plan for withdrawal of care after discussion with primary team.                VTE Risk Mitigation         Ordered     Medium Risk of VTE  Once      02/03/18 0443     Place sequential compression device  Until discontinued      02/03/18 0443          Tala Lafleur MD  Neurology  [facility]

## 2018-02-05 NOTE — PROGRESS NOTES
Pt started on continuous IV Fentanyl and given 2 mg of versed. Extubated by RT Henrik at 1625. Telemetry monitor showing asystole at this time. Strip printed and posted to chart. Critical Care doctor called to bedside.  called to bedside.  and family at bedside as well.  home contacted and information written down.

## 2018-02-08 LAB
BACTERIA BLD CULT: NORMAL
BACTERIA BLD CULT: NORMAL

## 2018-02-22 NOTE — PHYSICIAN QUERY
PT Name: Rena Quintana  MR #: [id number]    Physician Query Form - Relationship to Procedure Clarification     CDS/: Sheryl Last RN, CDS               Contact information: [email or web address]    This form is a permanent document in the medical record.     Query Date: February 22, 2018      By submitting this query, we are merely seeking further clarification of documentation. Please utilize your independent clinical judgment when addressing the question(s) below.    The Medical record contains the following:  Supporting Clinical Findings Location in Medical Record     --s/p PVI ablation on 1/5/18    H&P     --1/29/18: Presents to the ED after  1 day history of fever (as high as 102), malaise, N/V/D, and substernal chest pain (stabbing and radiating to the back and both arms)    --along with the lactobacillus bacteremia raised the concern that the patient may have suffered atria-esophageal fistula.   --Bacteremia         -Lactobacillus; 2 sets blood cultures positive from [facility]         - repeat blood cultures          - started Vanc and zosyn    --s/p PVI ablation on 1/5/18 who is admitted for atrial-esophageal fistula and lactobacillus bacteremia.    ---adequate management for bacteremia, sepsis     ---Cause of Death cardiac arrest due to septic shock.       GI C/S 2/3      H&P              ID C/S 2/3      Neuro C/S 2/3    Event Note 2/4     --On 1/30 esophagram was performed which did not show extravasation to suggest fistula. Today (2/2/18), she had an LP performed and one hour after suffered a PEA and underwent ACLS protocol with ROCS. 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      --She appears to be a lady who developed an esphagoatrial fistula after cath lab ablation.    --s/p PVI ablation on 1/5/18 who is admitted for atrial-esophageal fistula and lactobacillus  bacteremia.     --1/29/18: Presents to the ED after  1 day history of fever (as high as 102), malaise, N/V/D, and substernal chest pain (stabbing and radiating to the back and both arms)    --She had a trans-septal pulmonary vein isolation in the cath lab 1/5/2018 and was discharged same day. On the 29th she presented to the emergency department with fever, malaise, nausea, vomitting, diarrhea and chest pain.  --Repeat chest CTA report reads focus of air is noted within the posterior wall of the left atrium, presumably site of ablation finding can be seen with clinical concern for atrial-esophageal fistula       H&P            CTS Treatment Plan 2/3    ID C/S 2/3      GI C/S 2/3      CTS C/S 2/3       Please clarify if _bacteremia/sepsis_______ is    [  ] Inherent/Integral to procedure  [  ] Routine outcome  [  ] Incidental finding  [ x ] Complication of procedure  [  ] Clinically insignificant  [  ] Clinically undetermined    Please clarify if _atrial-esophageal fistula_______ is    [  ] Inherent/Integral to procedure  [  ] Routine outcome  [  ] Incidental finding  [x  ] Complication of procedure  [  ] Clinically insignificant  [  ] Clinically undetermined

## 2018-02-22 NOTE — PHYSICIAN QUERY
PT Name: Rena Quintana  MR #: [id number]     Physician Query Form - Documentation Clarification      CDS/: Sheryl Last RN, CDS               Contact information: [email or web address]    This form is a permanent document in the medical record.     Query Date: February 22, 2018    By submitting this query, we are merely seeking further clarification of documentation. Please utilize your independent clinical judgment when addressing the question(s) below.    The Medical record reflects the following:    Supporting Clinical Findings Location in Medical Record     --history significant for pulmonary HTN on home milrinone, CAD s/p EN, HFpEF, CIARAN, morbid obesity, persist afib        H&P     --1 - Mild left atrial enlargement.     2 - Mildly to moderately depressed left ventricular systolic function (EF 40-45%).     3 - Right ventricular enlargement with mildly depressed systolic function.     4 - Mild tricuspid regurgitation.     5 - Pulmonary hypertension. The estimated PA systolic pressure is 42 mmHg.     Echo 2/3                                                                            Doctor, Please specify diagnosis or diagnoses associated with above clinical findings.      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

## 2018-02-22 NOTE — PHYSICIAN QUERY
PT Name: Rena Quintana  MR #: [id number]    Physician Query Form - Cause and Effect Relationship Clarification      CDS/: Sheryl Last RN, CDS               Contact information: [email or web address]    This form is a permanent document in the medical record.     Query Date: February 22, 2018    By submitting this query, we are merely seeking further clarification of documentation. Please utilize your independent clinical judgment when addressing the question(s) below.    The Medical record contains the following:  Supporting Clinical Findings   Location in record                                                                       --lactobacillus bacteremia                    -Initially the PICC line line was thought to be the source of the infection but an atrial-esophageal fistula was also on the differential.             --Bacteremia         -Lactobacillus; 2 sets blood cultures positive from [facility]         - repeat blood cultures          - started Vanc and zosyn                                                                                                 [facility] H&P                                                                                              --adequate management for bacteremia, sepsis        --in setting of sepsis.                    --Cause of Death cardiac arrest due to septic shock.                                                                            Neuro C/S 2/3    Discharge Summary    Event Note 2/4         Provider, please clarify if there is any correlation between _sepsis__ and __Lactobacillus___.           Are the conditions:     [ x ] Due to or associated with each other     [  ] Unrelated to each other     [  ] Other (Please Specify): _________________________     [  ] Clinically Undetermined